# Patient Record
Sex: MALE | Race: ASIAN | NOT HISPANIC OR LATINO | URBAN - METROPOLITAN AREA
[De-identification: names, ages, dates, MRNs, and addresses within clinical notes are randomized per-mention and may not be internally consistent; named-entity substitution may affect disease eponyms.]

---

## 2021-06-12 ENCOUNTER — INPATIENT (INPATIENT)
Facility: HOSPITAL | Age: 66
LOS: 9 days | Discharge: HOME | DRG: 64 | End: 2021-06-22
Attending: NEUROLOGICAL SURGERY | Admitting: NEUROLOGICAL SURGERY
Payer: COMMERCIAL

## 2021-06-12 ENCOUNTER — TRANSCRIPTION ENCOUNTER (OUTPATIENT)
Age: 66
End: 2021-06-12

## 2021-06-12 ENCOUNTER — APPOINTMENT (OUTPATIENT)
Dept: NEUROSURGERY | Facility: HOSPITAL | Age: 66
End: 2021-06-12
Payer: MEDICARE

## 2021-06-12 ENCOUNTER — EMERGENCY (EMERGENCY)
Facility: HOSPITAL | Age: 66
LOS: 1 days | Discharge: ANOTHER HOSPITAL | End: 2021-06-12
Attending: EMERGENCY MEDICINE | Admitting: EMERGENCY MEDICINE
Payer: MEDICARE

## 2021-06-12 VITALS
SYSTOLIC BLOOD PRESSURE: 205 MMHG | DIASTOLIC BLOOD PRESSURE: 123 MMHG | HEART RATE: 91 BPM | TEMPERATURE: 98 F | OXYGEN SATURATION: 99 % | RESPIRATION RATE: 16 BRPM

## 2021-06-12 VITALS
DIASTOLIC BLOOD PRESSURE: 99 MMHG | RESPIRATION RATE: 22 BRPM | HEIGHT: 69 IN | OXYGEN SATURATION: 97 % | SYSTOLIC BLOOD PRESSURE: 139 MMHG | TEMPERATURE: 99 F | WEIGHT: 170.2 LBS | HEART RATE: 120 BPM

## 2021-06-12 VITALS
OXYGEN SATURATION: 97 % | DIASTOLIC BLOOD PRESSURE: 93 MMHG | HEART RATE: 114 BPM | RESPIRATION RATE: 20 BRPM | SYSTOLIC BLOOD PRESSURE: 167 MMHG

## 2021-06-12 DIAGNOSIS — I69.190 APRAXIA FOLLOWING NONTRAUMATIC INTRACEREBRAL HEMORRHAGE: ICD-10-CM

## 2021-06-12 DIAGNOSIS — I60.9 NONTRAUMATIC SUBARACHNOID HEMORRHAGE, UNSPECIFIED: ICD-10-CM

## 2021-06-12 DIAGNOSIS — I61.9 NONTRAUMATIC INTRACEREBRAL HEMORRHAGE, UNSPECIFIED: ICD-10-CM

## 2021-06-12 DIAGNOSIS — R09.89 OTHER SPECIFIED SYMPTOMS AND SIGNS INVOLVING THE CIRCULATORY AND RESPIRATORY SYSTEMS: ICD-10-CM

## 2021-06-12 LAB
ALBUMIN SERPL ELPH-MCNC: 4.5 G/DL — SIGNIFICANT CHANGE UP (ref 3.3–5)
ALP SERPL-CCNC: 118 U/L — SIGNIFICANT CHANGE UP (ref 40–120)
ALT FLD-CCNC: 38 U/L — SIGNIFICANT CHANGE UP (ref 4–41)
ANION GAP SERPL CALC-SCNC: 13 MMOL/L — SIGNIFICANT CHANGE UP (ref 7–14)
ANION GAP SERPL CALC-SCNC: 15 MMOL/L — SIGNIFICANT CHANGE UP (ref 5–17)
APPEARANCE UR: CLEAR — SIGNIFICANT CHANGE UP
APTT BLD: 30.2 SEC — SIGNIFICANT CHANGE UP (ref 27–36.3)
AST SERPL-CCNC: 31 U/L — SIGNIFICANT CHANGE UP (ref 4–40)
BASOPHILS # BLD AUTO: 0.08 K/UL — SIGNIFICANT CHANGE UP (ref 0–0.2)
BASOPHILS NFR BLD AUTO: 1.5 % — SIGNIFICANT CHANGE UP (ref 0–2)
BILIRUB SERPL-MCNC: 0.3 MG/DL — SIGNIFICANT CHANGE UP (ref 0.2–1.2)
BILIRUB UR-MCNC: NEGATIVE — SIGNIFICANT CHANGE UP
BLD GP AB SCN SERPL QL: NEGATIVE — SIGNIFICANT CHANGE UP
BLD GP AB SCN SERPL QL: NEGATIVE — SIGNIFICANT CHANGE UP
BUN SERPL-MCNC: 11 MG/DL — SIGNIFICANT CHANGE UP (ref 7–23)
BUN SERPL-MCNC: 15 MG/DL — SIGNIFICANT CHANGE UP (ref 7–23)
CALCIUM SERPL-MCNC: 9.4 MG/DL — SIGNIFICANT CHANGE UP (ref 8.4–10.5)
CALCIUM SERPL-MCNC: 9.8 MG/DL — SIGNIFICANT CHANGE UP (ref 8.4–10.5)
CHLORIDE SERPL-SCNC: 100 MMOL/L — SIGNIFICANT CHANGE UP (ref 98–107)
CHLORIDE SERPL-SCNC: 101 MMOL/L — SIGNIFICANT CHANGE UP (ref 96–108)
CO2 SERPL-SCNC: 21 MMOL/L — LOW (ref 22–31)
CO2 SERPL-SCNC: 23 MMOL/L — SIGNIFICANT CHANGE UP (ref 22–31)
COLOR SPEC: SIGNIFICANT CHANGE UP
CREAT SERPL-MCNC: 0.67 MG/DL — SIGNIFICANT CHANGE UP (ref 0.5–1.3)
CREAT SERPL-MCNC: 0.83 MG/DL — SIGNIFICANT CHANGE UP (ref 0.5–1.3)
DIFF PNL FLD: NEGATIVE — SIGNIFICANT CHANGE UP
EOSINOPHIL # BLD AUTO: 0.18 K/UL — SIGNIFICANT CHANGE UP (ref 0–0.5)
EOSINOPHIL NFR BLD AUTO: 3.5 % — SIGNIFICANT CHANGE UP (ref 0–6)
GLUCOSE SERPL-MCNC: 115 MG/DL — HIGH (ref 70–99)
GLUCOSE SERPL-MCNC: 131 MG/DL — HIGH (ref 70–99)
GLUCOSE UR QL: NEGATIVE — SIGNIFICANT CHANGE UP
HCT VFR BLD CALC: 49 % — SIGNIFICANT CHANGE UP (ref 39–50)
HGB BLD-MCNC: 16.5 G/DL — SIGNIFICANT CHANGE UP (ref 13–17)
IANC: 2.6 K/UL — SIGNIFICANT CHANGE UP (ref 1.5–8.5)
IMM GRANULOCYTES NFR BLD AUTO: 0.2 % — SIGNIFICANT CHANGE UP (ref 0–1.5)
INR BLD: 1.03 RATIO — SIGNIFICANT CHANGE UP (ref 0.88–1.16)
KETONES UR-MCNC: ABNORMAL
LEUKOCYTE ESTERASE UR-ACNC: NEGATIVE — SIGNIFICANT CHANGE UP
LYMPHOCYTES # BLD AUTO: 1.82 K/UL — SIGNIFICANT CHANGE UP (ref 1–3.3)
LYMPHOCYTES # BLD AUTO: 35 % — SIGNIFICANT CHANGE UP (ref 13–44)
MAGNESIUM SERPL-MCNC: 1.7 MG/DL — SIGNIFICANT CHANGE UP (ref 1.6–2.6)
MCHC RBC-ENTMCNC: 31.4 PG — SIGNIFICANT CHANGE UP (ref 27–34)
MCHC RBC-ENTMCNC: 33.7 GM/DL — SIGNIFICANT CHANGE UP (ref 32–36)
MCV RBC AUTO: 93.3 FL — SIGNIFICANT CHANGE UP (ref 80–100)
MONOCYTES # BLD AUTO: 0.51 K/UL — SIGNIFICANT CHANGE UP (ref 0–0.9)
MONOCYTES NFR BLD AUTO: 9.8 % — SIGNIFICANT CHANGE UP (ref 2–14)
NEUTROPHILS # BLD AUTO: 2.6 K/UL — SIGNIFICANT CHANGE UP (ref 1.8–7.4)
NEUTROPHILS NFR BLD AUTO: 50 % — SIGNIFICANT CHANGE UP (ref 43–77)
NITRITE UR-MCNC: NEGATIVE — SIGNIFICANT CHANGE UP
NRBC # BLD: 0 /100 WBCS — SIGNIFICANT CHANGE UP
NRBC # FLD: 0 K/UL — SIGNIFICANT CHANGE UP
PH UR: 6.5 — SIGNIFICANT CHANGE UP (ref 5–8)
PHOSPHATE SERPL-MCNC: 2.9 MG/DL — SIGNIFICANT CHANGE UP (ref 2.5–4.5)
PLATELET # BLD AUTO: 195 K/UL — SIGNIFICANT CHANGE UP (ref 150–400)
POTASSIUM SERPL-MCNC: 3.6 MMOL/L — SIGNIFICANT CHANGE UP (ref 3.5–5.3)
POTASSIUM SERPL-MCNC: 4.2 MMOL/L — SIGNIFICANT CHANGE UP (ref 3.5–5.3)
POTASSIUM SERPL-SCNC: 3.6 MMOL/L — SIGNIFICANT CHANGE UP (ref 3.5–5.3)
POTASSIUM SERPL-SCNC: 4.2 MMOL/L — SIGNIFICANT CHANGE UP (ref 3.5–5.3)
PROT SERPL-MCNC: 7.3 G/DL — SIGNIFICANT CHANGE UP (ref 6–8.3)
PROT UR-MCNC: ABNORMAL
PROTHROM AB SERPL-ACNC: 11.8 SEC — SIGNIFICANT CHANGE UP (ref 10.6–13.6)
RBC # BLD: 5.25 M/UL — SIGNIFICANT CHANGE UP (ref 4.2–5.8)
RBC # FLD: 12 % — SIGNIFICANT CHANGE UP (ref 10.3–14.5)
RH IG SCN BLD-IMP: POSITIVE — SIGNIFICANT CHANGE UP
SARS-COV-2 RNA SPEC QL NAA+PROBE: SIGNIFICANT CHANGE UP
SARS-COV-2 RNA SPEC QL NAA+PROBE: SIGNIFICANT CHANGE UP
SODIUM SERPL-SCNC: 136 MMOL/L — SIGNIFICANT CHANGE UP (ref 135–145)
SODIUM SERPL-SCNC: 137 MMOL/L — SIGNIFICANT CHANGE UP (ref 135–145)
SP GR SPEC: 1.03 — HIGH (ref 1.01–1.02)
UROBILINOGEN FLD QL: SIGNIFICANT CHANGE UP
WBC # BLD: 5.2 K/UL — SIGNIFICANT CHANGE UP (ref 3.8–10.5)
WBC # FLD AUTO: 5.2 K/UL — SIGNIFICANT CHANGE UP (ref 3.8–10.5)

## 2021-06-12 PROCEDURE — 99291 CRITICAL CARE FIRST HOUR: CPT

## 2021-06-12 PROCEDURE — 99284 EMERGENCY DEPT VISIT MOD MDM: CPT

## 2021-06-12 PROCEDURE — 76377 3D RENDER W/INTRP POSTPROCES: CPT | Mod: 26

## 2021-06-12 PROCEDURE — 71045 X-RAY EXAM CHEST 1 VIEW: CPT | Mod: 26

## 2021-06-12 PROCEDURE — 36227 PLACE CATH XTRNL CAROTID: CPT

## 2021-06-12 PROCEDURE — 36223 PLACE CATH CAROTID/INOM ART: CPT | Mod: 59,RT

## 2021-06-12 PROCEDURE — 70498 CT ANGIOGRAPHY NECK: CPT | Mod: 26

## 2021-06-12 PROCEDURE — 70450 CT HEAD/BRAIN W/O DYE: CPT | Mod: 26,59

## 2021-06-12 PROCEDURE — 36224 PLACE CATH CAROTD ART: CPT | Mod: LT

## 2021-06-12 PROCEDURE — 36620 INSERTION CATHETER ARTERY: CPT

## 2021-06-12 PROCEDURE — 70496 CT ANGIOGRAPHY HEAD: CPT | Mod: 26

## 2021-06-12 PROCEDURE — 36226 PLACE CATH VERTEBRAL ART: CPT | Mod: 50

## 2021-06-12 RX ORDER — LEVETIRACETAM 250 MG/1
500 TABLET, FILM COATED ORAL
Refills: 0 | Status: DISCONTINUED | OUTPATIENT
Start: 2021-06-12 | End: 2021-06-15

## 2021-06-12 RX ORDER — SENNA PLUS 8.6 MG/1
2 TABLET ORAL AT BEDTIME
Refills: 0 | Status: DISCONTINUED | OUTPATIENT
Start: 2021-06-12 | End: 2021-06-22

## 2021-06-12 RX ORDER — POTASSIUM CHLORIDE 20 MEQ
40 PACKET (EA) ORAL ONCE
Refills: 0 | Status: COMPLETED | OUTPATIENT
Start: 2021-06-12 | End: 2021-06-13

## 2021-06-12 RX ORDER — NICARDIPINE HYDROCHLORIDE 30 MG/1
10 CAPSULE, EXTENDED RELEASE ORAL
Qty: 40 | Refills: 0 | Status: DISCONTINUED | OUTPATIENT
Start: 2021-06-12 | End: 2021-06-13

## 2021-06-12 RX ORDER — MAGNESIUM SULFATE 500 MG/ML
1 VIAL (ML) INJECTION ONCE
Refills: 0 | Status: COMPLETED | OUTPATIENT
Start: 2021-06-12 | End: 2021-06-13

## 2021-06-12 RX ORDER — METOCLOPRAMIDE HCL 10 MG
10 TABLET ORAL ONCE
Refills: 0 | Status: COMPLETED | OUTPATIENT
Start: 2021-06-12 | End: 2021-06-12

## 2021-06-12 RX ORDER — NIMODIPINE 60 MG/10ML
60 SOLUTION ORAL EVERY 4 HOURS
Refills: 0 | Status: DISCONTINUED | OUTPATIENT
Start: 2021-06-12 | End: 2021-06-22

## 2021-06-12 RX ORDER — CHLORHEXIDINE GLUCONATE 213 G/1000ML
1 SOLUTION TOPICAL
Refills: 0 | Status: DISCONTINUED | OUTPATIENT
Start: 2021-06-12 | End: 2021-06-18

## 2021-06-12 RX ORDER — SODIUM CHLORIDE 9 MG/ML
1000 INJECTION, SOLUTION INTRAVENOUS ONCE
Refills: 0 | Status: COMPLETED | OUTPATIENT
Start: 2021-06-12 | End: 2021-06-12

## 2021-06-12 RX ORDER — ACETAMINOPHEN 500 MG
1000 TABLET ORAL ONCE
Refills: 0 | Status: COMPLETED | OUTPATIENT
Start: 2021-06-12 | End: 2021-06-12

## 2021-06-12 RX ORDER — NICARDIPINE HYDROCHLORIDE 30 MG/1
5 CAPSULE, EXTENDED RELEASE ORAL
Qty: 40 | Refills: 0 | Status: DISCONTINUED | OUTPATIENT
Start: 2021-06-12 | End: 2021-06-16

## 2021-06-12 RX ORDER — SODIUM CHLORIDE 9 MG/ML
1000 INJECTION INTRAMUSCULAR; INTRAVENOUS; SUBCUTANEOUS
Refills: 0 | Status: DISCONTINUED | OUTPATIENT
Start: 2021-06-12 | End: 2021-06-13

## 2021-06-12 RX ORDER — ACETAMINOPHEN 500 MG
650 TABLET ORAL EVERY 6 HOURS
Refills: 0 | Status: DISCONTINUED | OUTPATIENT
Start: 2021-06-12 | End: 2021-06-17

## 2021-06-12 RX ORDER — POLYETHYLENE GLYCOL 3350 17 G/17G
17 POWDER, FOR SOLUTION ORAL DAILY
Refills: 0 | Status: DISCONTINUED | OUTPATIENT
Start: 2021-06-12 | End: 2021-06-14

## 2021-06-12 RX ADMIN — SODIUM CHLORIDE 75 MILLILITER(S): 9 INJECTION INTRAMUSCULAR; INTRAVENOUS; SUBCUTANEOUS at 19:00

## 2021-06-12 RX ADMIN — NIMODIPINE 60 MILLIGRAM(S): 60 SOLUTION ORAL at 18:37

## 2021-06-12 RX ADMIN — LEVETIRACETAM 500 MILLIGRAM(S): 250 TABLET, FILM COATED ORAL at 18:37

## 2021-06-12 RX ADMIN — Medication 400 MILLIGRAM(S): at 15:32

## 2021-06-12 RX ADMIN — NICARDIPINE HYDROCHLORIDE 25 MG/HR: 30 CAPSULE, EXTENDED RELEASE ORAL at 14:32

## 2021-06-12 RX ADMIN — SODIUM CHLORIDE 1000 MILLILITER(S): 9 INJECTION, SOLUTION INTRAVENOUS at 13:58

## 2021-06-12 RX ADMIN — Medication 10 MILLIGRAM(S): at 14:31

## 2021-06-12 NOTE — PROGRESS NOTE ADULT - SUBJECTIVE AND OBJECTIVE BOX
HPI:  65 year old man knonw to have HTN is presenting with severe headache. CT head showed SAH CTA no vascular malformation     EVENTS:   transferred to the [facility] for SAH workup and treatement       ICU Vital Signs Last 24 Hrs  T(C): 37.1 (12 Jun 2021 16:30), Max: 37.1 (12 Jun 2021 16:30)  T(F): 98.7 (12 Jun 2021 16:30), Max: 98.7 (12 Jun 2021 16:30)  HR: 114 (12 Jun 2021 15:50) (91 - 114)  BP: 139/99 (12 Jun 2021 16:30) (139/99 - 234/147)  BP(mean): 109 (12 Jun 2021 16:30) (109 - 129)  ABP: --  ABP(mean): --  RR: 22 (12 Jun 2021 16:30) (12 - 22)  SpO2: 97% (12 Jun 2021 16:30) (97% - 100%)                            16.5   5.20  )-----------( 195      ( 12 Jun 2021 13:30 )             49.0    06-12    136  |  100  |  15  ----------------------------<  115<H>  4.2   |  23  |  0.83    Ca    9.8      12 Jun 2021 13:30    TPro  7.3  /  Alb  4.5  /  TBili  0.3  /  DBili  x   /  AST  31  /  ALT  38  /  AlkPhos  118  06-12            PHYSICAL EXAM:    General: No Acute Distress     Neurological: Awake, alert oriented to person, place and time, Following Commands, right pupil 3 mm, left pupil 2 mm, EOMI, Face Symmetrical, Speech Fluent, Moving all extremities, Muscle Strength normal in all four extremities, No Drift, Sensation to Light Touch Intact    Pulmonary: Clear to Auscultation, No Rales, No Rhonchi, No Wheezes     Cardiovascular: S1, S2, Regular Rate and Rhythm     Gastrointestinal: Soft, Nontender, Nondistended     Extremities: No calf tenderness     Incision:       MEDICATIONS:  Antibiotics:      Neurological:     Cardiac:     Pulm:    Heme:     Other:        DEVICES: [] Restraints [] GÓMEZ/HMV []LD [] ET tube [] Trach [] Chest Tube [] A-line [] Tyson [] NGT [] Rectal Tube       A/P:  Neuro: neuro checks q 1 hr, CT head tomorrow morning, angio tomorrow, nimodipine, keppra 500 mg BID for seizure prophylaxis fentanyl PRN for agitation   Respiratory: RA  CV: NSR,  TTE, -140 mmhg, on nicardipine  Endocrine: finger sticks q6hrs, ISS , keep sugar 120-180 mmhg   Heme/Onc: INR <1.5, Plt>100            DVT ppx: SCD, contraindicated to start anticoagulant as she is PBD0   Renal: NS 75 ml/hr  ID: afebrile  GI: NPO, protonix, NG, colace   Social/Family: updated at bedside  Discharge planning: ICU    Code Status: [x] Full Code [] DNR [] DNI [] Goals of Care:   Disposition: [x] ICU [] Stroke Unit [] RCU []PCU []Floor [] Discharge Home     Patient at high risk for neurologic deterioration, aneurysm rerupture, seizures, ICP crisis, critical care time, excluding procedures: 40 minutes                   HPI:  65 year old man knonw to have HTN is presenting with severe headache. CT head showed SAH CTA no vascular malformation     EVENTS:   [facility] for SAH workup and treatement       ICU Vital Signs Last 24 Hrs  T(C): 37.1 (12 Jun 2021 16:30), Max: 37.1 (12 Jun 2021 16:30)  T(F): 98.7 (12 Jun 2021 16:30), Max: 98.7 (12 Jun 2021 16:30)  HR: 114 (12 Jun 2021 15:50) (91 - 114)  BP: 139/99 (12 Jun 2021 16:30) (139/99 - 234/147)  BP(mean): 109 (12 Jun 2021 16:30) (109 - 129)  ABP: --  ABP(mean): --  RR: 22 (12 Jun 2021 16:30) (12 - 22)  SpO2: 97% (12 Jun 2021 16:30) (97% - 100%)                            16.5   5.20  )-----------( 195      ( 12 Jun 2021 13:30 )             49.0    06-12    136  |  100  |  15  ----------------------------<  115<H>  4.2   |  23  |  0.83    Ca    9.8      12 Jun 2021 13:30    TPro  7.3  /  Alb  4.5  /  TBili  0.3  /  DBili  x   /  AST  31  /  ALT  38  /  AlkPhos  118  06-12            PHYSICAL EXAM:    General: No Acute Distress     Neurological: Awake, alert oriented to person, place and time, Following Commands, right pupil 3 mm, left pupil 2 mm, EOMI, Face Symmetrical, Speech Fluent, Moving all extremities, Muscle Strength normal in all four extremities, No Drift, Sensation to Light Touch Intact    Pulmonary: Clear to Auscultation, No Rales, No Rhonchi, No Wheezes     Cardiovascular: S1, S2, Regular Rate and Rhythm     Gastrointestinal: Soft, Nontender, Nondistended     Extremities: No calf tenderness     Incision:       MEDICATIONS:  Antibiotics:      Neurological:     Cardiac:     Pulm:    Heme:     Other:        DEVICES: [] Restraints [] GÓMEZ/HMV []LD [] ET tube [] Trach [] Chest Tube [] A-line [] Tyson [] NGT [] Rectal Tube       A/P: SAH likely aneurysmal although CTA is neg  mFS 2, HHS 2  NIHSS 0   Neuro: neuro checks q 1 hr, CT head tomorrow morning, angio tomorrow, nimodipine, keppra 500 mg BID for seizure prophylaxis fentanyl PRN for agitation   Respiratory: RA  CV: NSR,  TTE, -140 mmhg, on nicardipine  Endocrine: finger sticks q6hrs, ISS , keep sugar 120-180 mmhg   Heme/Onc: INR <1.5, Plt>100            DVT ppx: SCD, contraindicated to start anticoagulant as she is PBD0   Renal: NS 75 ml/hr  ID: afebrile  GI: NPO, protonix, NG, colace   Social/Family: updated at bedside  Discharge planning: ICU    Code Status: [x] Full Code [] DNR [] DNI [] Goals of Care:   Disposition: [x] ICU [] Stroke Unit [] RCU []PCU []Floor [] Discharge Home     Patient at high risk for neurologic deterioration, aneurysm rerupture, seizures, ICP crisis, critical care time, excluding procedures: 40 minutes

## 2021-06-12 NOTE — ED PROVIDER NOTE - PHYSICAL EXAMINATION
VS:  unremarkable except HTN    GEN - malaise HTN;   A+O x3   HEAD - NC/AT     ENT - PEERL, EOMI, mucous membranes    moist , no discharge      NECK: Neck supple, non-tender without lymphadenopathy, no masses, no JVD  PULM - CTA b/l,  symmetric breath sounds  COR -  normal heart sounds    ABD - , ND, NT, soft,  BACK - no CVA tenderness, nontender spine     EXTREMS - no edema, no deformity, warm and well perfused    SKIN - no rash    or bruising      NEUROLOGIC - alert, face symmetric, speech fluent, sensation nl, motor no focal deficit.

## 2021-06-12 NOTE — ED ADULT NURSE REASSESSMENT NOTE - NS ED NURSE REASSESS COMMENT FT1
Anai RN: EMS here for transport to neuro ICU at [facility]. Pt is awake, responsive, AOX4, sinus tachy on the cardiac monitor, continues to be on Cardene GTT. Pt verbalized pain relief. Report given to receiving ICU RN Brunilda. Pt stable at this time

## 2021-06-12 NOTE — ED PROVIDER NOTE - CLINICAL SUMMARY MEDICAL DECISION MAKING FREE TEXT BOX
65M h/o HTN p/w headache starting at 1030 worsening over an hour to 1130 at which point it was severe.  At that time pt had slurred speech which improved and is now fine.  Still having HA.  Never happened before.  USOH prior to event.  No LOC or vomiting.  Pt malaised mild distress HA.  Speech is fluent.  face symmetric strength equal and full x 4 extrems.  Given in the window TIA stroke code called.  Poss I[facility] will check CTH.  D/w neuro at 115pm.

## 2021-06-12 NOTE — ED PROVIDER NOTE - OBJECTIVE STATEMENT
64 yo M w/ a PMHx of p/w severe HA, back of neck pain, and palpitations since this AM. Starting at 10:30am he had palps then at 11:30am while working, he developed nausea, dizziness, and neck pain. Noted to be hypertensive here (205/123).     PMD:

## 2021-06-12 NOTE — CONSULT NOTE ADULT - ATTENDING COMMENTS
66yo R-handed man with PMH of HTN presents to the ED with headache, nausea, and palpitations since this morning. LKN 1030h. 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 Additionally ED staff reported patient had transient slurred speech at approximately 1100h which resolved   neursurgery consult    mri   angiogram
SAH, transfer to [facility] for management

## 2021-06-12 NOTE — ED ADULT TRIAGE NOTE - CHIEF COMPLAINT QUOTE
brought in by EMS from workplace. Pt states c/o palpitations at 1030 this morning, went to work and at 1130 had sudden onset of dizziness, HA and nausea and posterior neck pain. brought in by EMS from workplace. Pt states c/o palpitations at 1030 this morning, went to work and at 1130 had sudden onset of dizziness, HA and nausea and posterior neck pain. Pt states he was moving body up and down carrying light things, heavy things at work.

## 2021-06-12 NOTE — PROCEDURE NOTE - NSPROCDETAILS_GEN_ALL_CORE
location identified, draped/prepped, sterile technique used, needle inserted/introduced/connected to a pressurized flush line/sutured in place/hemostasis with direct pressure, dressing applied/Seldinger technique/all materials/supplies accounted for at end of procedure

## 2021-06-12 NOTE — CONSULT NOTE ADULT - ASSESSMENT
OH, GWANG  65M pw severe HA, neck pain this am found to have SAH HH1MF3, R carotid occlusion at bifurcation, and a hypoplastic L vert, and an abnormal area of enhancement anterior to the basilar concerning for a vascular abnormality.   Exam: AOx3, FC, PERRL, EOMI, no facial, 5/5 throughout, no drift  [facility]  -q1 neurochecks  -Keppra 1000  --140, nicardipine drip as needed  -pre op for angiogram, npo, coags

## 2021-06-12 NOTE — STROKE CODE NOTE - HUNT AND HESS GRADING
(2) Moderate to severe headache, nuchal rigidity, no neurological deficit or other cranial nerve palsy

## 2021-06-12 NOTE — ED ADULT NURSE NOTE - CHIEF COMPLAINT QUOTE
brought in by EMS from workplace. Pt states c/o palpitations at 1030 this morning, went to work and at 1130 had sudden onset of dizziness, HA and nausea and posterior neck pain. Pt states he was moving body up and down carrying light things, heavy things at work.

## 2021-06-12 NOTE — H&P ADULT - NSHPPHYSICALEXAM_GEN_ALL_CORE
Alter and oriented x3, Following commands, Rt pupil 3mm Lt pupil 2mm, moving all extremities 5/5, no drift

## 2021-06-12 NOTE — H&P ADULT - HISTORY OF PRESENT ILLNESS
65 year old male with no significant PMHx  presented on the morning od admission with severe HA, neck pain, and palpitations. At 10:30am he had palps then at 11:30am while working, he developed nausea, dizziness, and neck pain. Noted to be hypertensive on arrival to the hospital (205/123).   CT head and CTA showed Hyperattenuation in the prepontine cistern, left ambient cistern, and left lateral portion of the suprasellar cistern compatible with subarachnoid hemorrhage. 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  CT angiography brain was negative. Recommended for further angiogram evaluation.            65 year old male with PMHx of HTN (doesn't remember his home meds) presented on the morning od admission with severe HA, neck pain, and palpitations. At 10:30am he had palps then at 11:30am while working, he developed nausea, dizziness, and neck pain. Noted to be hypertensive on arrival to the hospital (205/123).   CT head and CTA showed Hyperattenuation in the prepontine cistern, left ambient cistern, and left lateral portion of the suprasellar cistern compatible with subarachnoid hemorrhage. 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  CT angiography brain was negative. Recommended for further angiogram evaluation.

## 2021-06-12 NOTE — CONSULT NOTE ADULT - SUBJECTIVE AND OBJECTIVE BOX
Neurology  Consult Note  06-12-21    Name:  TERRELL RIOJAS; 65y (1955)    Neurology consult: 64yo R-handed man with PMH of HTN presents to the ED with headache, nausea, and palpitations since this morning. LKN 1030h. 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 Additionally ED staff reported patient had transient slurred speech at approximately 1100h which resolved. Patient reports the headache is 10/10 in severity and started suddenly. He reports taking blood pressure medications regularly and does not take aspirin or anticoagulation. He denies weakness, numbness, acute changes in vision, vomiting, dizziness, difficulty speaking or swallowing, recent fever illness, trauma or neck manipulation.    In the ED code stroke was activated. NIHSS 0. Pre-MRS 0. CTH shows a subarachnoid hemorrhage. CTA head/neck shows occlusion of the R ICA starting at the carotid bifurcation and a hypoattenuating R thyroid gland. The contrast enhancement of the vascular structure in the L perimesencephalic cistern does not have the appearance of a P-comm aneurysm. Patient was not a candidate for tPA or thrombectomy due to the subarachnoid hemorrhage.    Review of Systems:  As states in HPI.        PMHx:   HTN      PFHx:   No pertinent family history in first degree relatives      PSuHx:   No significant past surgical history        Medications:  MEDICATIONS  (STANDING):  niCARdipine Infusion 5 mG/Hr (25 mL/Hr) IV Continuous <Continuous>      Vitals:  T(C): 37.1 (06-12-21 @ 16:30), Max: 37.1 (06-12-21 @ 16:30)  HR: 116 (06-12-21 @ 17:00) (91 - 127)  BP: 126/87 (06-12-21 @ 16:45) (126/87 - 234/147)  RR: 20 (06-12-21 @ 17:00) (12 - 22)  SpO2: 97% (06-12-21 @ 17:00) (97% - 100%)    Physical Examination:  Neurologic:  - Mental Status: Alert, awake, oriented to person, place, and time; Speech is fluent with intact naming, repetition, and comprehension; Good overall fund of knowledge; Grossly follows all commands.  - Cranial Nerves:  II: Visual fields are full to confrontation; Pupils are equal, round, and reactive to light;  III, IV, VI: Extraocular movements are intact without nystagmus.  V: Facial sensation is intact in the V1-V3 distribution bilaterally.  VII: Face is symmetric with normal eye closure and smile.  VIII: Hearing is intact to finger rub.  IX, X: Uvula is midline and soft palate rises symmetrically.  XI: Head turning and shoulder shrug are intact.  XII: Tongue protrudes in the midline.  - Motor: Strength is 5/5 throughout. There is no pronator drift.  - Reflexes: 2+ and symmetric at the biceps, brachioradialis, knees, and ankles. Plantar responses down bilaterally.  - Sensory: Intact throughout to light touch. No extinction on double stimulation.  - Coordination: Finger-nose-finger intact without dysmetria.  - Gait: Deferred    Labs:                        16.5   5.20  )-----------( 195      ( 12 Jun 2021 13:30 )             49.0     06-12    136  |  100  |  15  ----------------------------<  115<H>  4.2   |  23  |  0.83    Ca    9.8      12 Jun 2021 13:30    TPro  7.3  /  Alb  4.5  /  TBili  0.3  /  DBili  x   /  AST  31  /  ALT  38  /  AlkPhos  118  06-12    CAPILLARY BLOOD GLUCOSE      POCT Blood Glucose.: 114 mg/dL (12 Jun 2021 12:59)    LIVER FUNCTIONS - ( 12 Jun 2021 13:30 )  Alb: 4.5 g/dL / Pro: 7.3 g/dL / ALK PHOS: 118 U/L / ALT: 38 U/L / AST: 31 U/L / GGT: x             PT/INR - ( 12 Jun 2021 13:30 )   PT: 11.8 sec;   INR: 1.03 ratio    PTT - ( 12 Jun 2021 13:30 )  PTT:30.2 sec        Radiology:  CT Brain Stroke Protocol (06.12.21 @ 13:29)  IMPRESSION:  Hyperattenuation in the prepontine cistern, left ambient cistern, and left lateral portion of the suprasellar cistern compatible with subarachnoid hemorrhage. CTA is recommended.    CT Angio Head w/ IV Cont (06.12.21 @ 13:30)  IMPRESSION:  CT angiography neck:  Occlusion of the right internal carotid artery originating at the carotid bifurcation through the distal cavernous segment.    Hypoattenuating focus in the right thyroid gland. Recommend thyroid ultrasound.    CT angiography brain:    Subarachnoid hemorrhage.  Contrast enhancement of an apparent vascular structure in the left perimesencephalic cistern which does not have the appearance of a P-comm aneurysm. Recommend cerebral angiogram for further evaluation.   Neurology  Consult Note  06-12-21    Name:  TERRELL RIOJAS; 65y (1955)    Neurology consult: 66yo R-handed man with PMH of HTN presents to the ED with headache, nausea, and palpitations since this morning. LKN 1030h. 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 Additionally ED staff reported patient had transient slurred speech at approximately 1100h which resolved. Patient reports the headache is 10/10 in severity and started suddenly. He reports taking blood pressure medications regularly and does not take aspirin or anticoagulation. He reported possibly darkening of his vision but denied vision loss, weakness, numbness, vomiting, dizziness, difficulty speaking or swallowing, recent fever illness, trauma or neck manipulation.    In the ED code stroke was activated. NIHSS 0. Pre-MRS 0. CTH shows a subarachnoid hemorrhage. CTA head/neck shows occlusion of the R ICA starting at the carotid bifurcation and a hypoattenuating R thyroid gland. The contrast enhancement of the vascular structure in the L perimesencephalic cistern does not have the appearance of a P-comm aneurysm. Patient was not a candidate for tPA or thrombectomy due to the subarachnoid hemorrhage.    Review of Systems:  As states in HPI.        PMHx:   HTN      PFHx:   No pertinent family history in first degree relatives      PSuHx:   No significant past surgical history        Medications:  MEDICATIONS  (STANDING):  niCARdipine Infusion 5 mG/Hr (25 mL/Hr) IV Continuous <Continuous>      Vitals:  T(C): 37.1 (06-12-21 @ 16:30), Max: 37.1 (06-12-21 @ 16:30)  HR: 116 (06-12-21 @ 17:00) (91 - 127)  BP: 126/87 (06-12-21 @ 16:45) (126/87 - 234/147)  RR: 20 (06-12-21 @ 17:00) (12 - 22)  SpO2: 97% (06-12-21 @ 17:00) (97% - 100%)    Physical Examination:  Neurologic:  - Mental Status: Alert, awake, oriented to person, place, and time; Speech is fluent with intact naming, repetition, and comprehension; Good overall fund of knowledge; Grossly follows all commands.  - Cranial Nerves:  II: Visual fields are full to confrontation; Pupils are equal, round, and reactive to light;  III, IV, VI: Extraocular movements are intact without nystagmus.  V: Facial sensation is intact in the V1-V3 distribution bilaterally.  VII: Face is symmetric with normal eye closure and smile.  VIII: Hearing is intact to finger rub.  IX, X: Uvula is midline and soft palate rises symmetrically.  XI: Head turning and shoulder shrug are intact.  XII: Tongue protrudes in the midline.  - Motor: Strength is 5/5 throughout. There is no pronator drift.  - Reflexes: 2+ and symmetric at the biceps, brachioradialis, knees, and ankles. Plantar responses down bilaterally.  - Sensory: Intact throughout to light touch. No extinction on double stimulation.  - Coordination: Finger-nose-finger intact without dysmetria.  - Gait: Deferred    Labs:                        16.5   5.20  )-----------( 195      ( 12 Jun 2021 13:30 )             49.0     06-12    136  |  100  |  15  ----------------------------<  115<H>  4.2   |  23  |  0.83    Ca    9.8      12 Jun 2021 13:30    TPro  7.3  /  Alb  4.5  /  TBili  0.3  /  DBili  x   /  AST  31  /  ALT  38  /  AlkPhos  118  06-12    CAPILLARY BLOOD GLUCOSE      POCT Blood Glucose.: 114 mg/dL (12 Jun 2021 12:59)    LIVER FUNCTIONS - ( 12 Jun 2021 13:30 )  Alb: 4.5 g/dL / Pro: 7.3 g/dL / ALK PHOS: 118 U/L / ALT: 38 U/L / AST: 31 U/L / GGT: x             PT/INR - ( 12 Jun 2021 13:30 )   PT: 11.8 sec;   INR: 1.03 ratio    PTT - ( 12 Jun 2021 13:30 )  PTT:30.2 sec        Radiology:  CT Brain Stroke Protocol (06.12.21 @ 13:29)  IMPRESSION:  Hyperattenuation in the prepontine cistern, left ambient cistern, and left lateral portion of the suprasellar cistern compatible with subarachnoid hemorrhage. CTA is recommended.    CT Angio Head w/ IV Cont (06.12.21 @ 13:30)  IMPRESSION:  CT angiography neck:  Occlusion of the right internal carotid artery originating at the carotid bifurcation through the distal cavernous segment.    Hypoattenuating focus in the right thyroid gland. Recommend thyroid ultrasound.    CT angiography brain:    Subarachnoid hemorrhage.  Contrast enhancement of an apparent vascular structure in the left perimesencephalic cistern which does not have the appearance of a P-comm aneurysm. Recommend cerebral angiogram for further evaluation.

## 2021-06-12 NOTE — CHART NOTE - NSCHARTNOTEFT_GEN_A_CORE
Interventional Neuro- Radiology   Procedure Note    Procedure: Selective Cerebral Angiography   Pre- Procedure Diagnosis: SAH  Post- Procedure Diagnosis: negative for source of hemorrhage    : Dr. Ray MD  Fellow: Dr. Poppy MD  Physician Assistant: Patsy Galicia PA-C    RN: Elza  Tech: Karl/ Tashi    Anesthesia: Dr. Terra Calvillo MD  (general anesthesia)    I/Os:  Fluids: 1 L  Tyson: 700 cc  Contrast: 106 cc  Estimated Blood Loss: <10cc    ACT-147     Preliminary Report:  Under general anesthesia, using a 5Fr 90 arrowflex sheath to the right groin examination of left vertebral artery/ left internal carotid artery/ left external carotid artery/ right vertebral artery/ right internal carotid artery/ right external carotid artery via selective cerebral angiography demonstrates negative cerebral angiogram for source of hemorrhage. (Official note to follow).    Patient tolerated procedure well, vital signs stable, hemodynamically stable, no change in neurological status compared to baseline. Results discussed with neurosurgery/ patient and their family. Groin sheath d/c'ed, manual compression held to hemostasis, no active bleeding, no hematoma, vascade closure device applied, quick clot and safeguard balloon dressing applied at 9:45h. Patient transferred to NSCU for further care/ monitoring.     Patsy Galicia PA-C

## 2021-06-12 NOTE — CONSULT NOTE ADULT - SUBJECTIVE AND OBJECTIVE BOX
TERRELL RIOJAS 65y ,Male  HPI:    PAST MEDICAL & SURGICAL HISTORY:    Allergies  No Known Allergies    Intolerances      MEDICATIONS  (STANDING):  lactated ringers Bolus 1000 milliLiter(s) IV Bolus once  metoclopramide Injectable 10 milliGRAM(s) IV Push once  niCARdipine Infusion 5 mG/Hr (25 mL/Hr) IV Continuous <Continuous>    MEDICATIONS  (PRN):    Vital Signs Last 24 Hrs  T(C): 36.9 (12 Jun 2021 12:54), Max: 36.9 (12 Jun 2021 12:54)  T(F): 98.4 (12 Jun 2021 12:54), Max: 98.4 (12 Jun 2021 12:54)  HR: 91 (12 Jun 2021 12:54) (91 - 91)  BP: 205/123 (12 Jun 2021 12:54) (205/123 - 205/123)  BP(mean): --  RR: 16 (12 Jun 2021 12:54) (16 - 16)  SpO2: 99% (12 Jun 2021 12:54) (99% - 99%)    PE:  AA&0 x 3, CN 2-12 grossly intact, speach clear, follows commands, PERRL  Motor: strength : BUE  Sensory:  Incision site:     LABS:                        16.5   5.20  )-----------( 195      ( 12 Jun 2021 13:30 )             49.0           PT/INR - ( 12 Jun 2021 13:30 )   PT: 11.8 sec;   INR: 1.03 ratio         PTT - ( 12 Jun 2021 13:30 )  PTT:30.2 sec           TERRELL RIOJAS 65y ,Male  HPI:  66 yo M p/w severe HA, neck pain, and palpitations since this AM. Starting at 10:30am he had palps then at 11:30am while working, he developed nausea, dizziness, and neck pain. Noted to be hypertensive here (205/123).   CT head and CTA showed Hyperattenuation in the prepontine cistern, left ambient cistern, and left lateral portion of the suprasellar cistern compatible with subarachnoid hemorrhage. CTA is recommended.      PAST MEDICAL & SURGICAL HISTORY:    Allergies  No Known Allergies    MEDICATIONS  (STANDING):  lactated ringers Bolus 1000 milliLiter(s) IV Bolus once  metoclopramide Injectable 10 milliGRAM(s) IV Push once  niCARdipine Infusion 5 mG/Hr (25 mL/Hr) IV Continuous <Continuous>    Vital Signs Last 24 Hrs  T(C): 36.9 (12 Jun 2021 12:54), Max: 36.9 (12 Jun 2021 12:54)  T(F): 98.4 (12 Jun 2021 12:54), Max: 98.4 (12 Jun 2021 12:54)  HR: 91 (12 Jun 2021 12:54) (91 - 91)  BP: 205/123 (12 Jun 2021 12:54) (205/123 - 205/123)  BP(mean): --  RR: 16 (12 Jun 2021 12:54) (16 - 16)  SpO2: 99% (12 Jun 2021 12:54) (99% - 99%)    PE:  AA&0 x   Motor: strength : BUE  Sensory:      LABS:                        16.5   5.20  )-----------( 195      ( 12 Jun 2021 13:30 )             49.0           PT/INR - ( 12 Jun 2021 13:30 )   PT: 11.8 sec;   INR: 1.03 ratio         PTT - ( 12 Jun 2021 13:30 )  PTT:30.2 sec           TERRELL ROIJAS 65y ,Male  HPI:  66 yo M p/w severe HA, neck pain, and palpitations since this AM. Starting at 10:30am he had palps then at 11:30am while working, he developed nausea, dizziness, and neck pain. Noted to be hypertensive here (205/123).   CT head and CTA showed Hyperattenuation in the prepontine cistern, left ambient cistern, and left lateral portion of the suprasellar cistern compatible with subarachnoid hemorrhage. 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  Hypoattenuating focus in the right thyroid gland. Recommend thyroid ultrasound.  CT angiography brain:  Subarachnoid hemorrhage. Contrast enhancement of an apparent vascular structure in the left perimesencephalic cistern which does not have the appearance of a P-comm aneurysm. Recommend cerebral angiogram for further evaluation.    PAST MEDICAL & SURGICAL HISTORY:    Allergies  No Known Allergies    MEDICATIONS  (STANDING):  lactated ringers Bolus 1000 milliLiter(s) IV Bolus once  metoclopramide Injectable 10 milliGRAM(s) IV Push once  niCARdipine Infusion 5 mG/Hr (25 mL/Hr) IV Continuous <Continuous>    Vital Signs Last 24 Hrs  T(C): 36.9 (12 Jun 2021 12:54), Max: 36.9 (12 Jun 2021 12:54)  T(F): 98.4 (12 Jun 2021 12:54), Max: 98.4 (12 Jun 2021 12:54)  HR: 91 (12 Jun 2021 12:54) (91 - 91)  BP: 205/123 (12 Jun 2021 12:54) (205/123 - 205/123)  BP(mean): --  RR: 16 (12 Jun 2021 12:54) (16 - 16)  SpO2: 99% (12 Jun 2021 12:54) (99% - 99%)    PE:  AA&0 x   Motor: strength : BUE  Sensory:      LABS:                        16.5   5.20  )-----------( 195      ( 12 Jun 2021 13:30 )             49.0           PT/INR - ( 12 Jun 2021 13:30 )   PT: 11.8 sec;   INR: 1.03 ratio         PTT - ( 12 Jun 2021 13:30 )  PTT:30.2 sec           TERRELL RIOJAS 65y ,Male  HPI:  64 yo M p/w severe HA, neck pain, and palpitations since this AM. Starting at 10:30am he had palps then at 11:30am while working, he developed nausea, dizziness, and neck pain. Noted to be hypertensive here (205/123).   CT head and CTA showed Hyperattenuation in the prepontine cistern, left ambient cistern, and left lateral portion of the suprasellar cistern compatible with subarachnoid hemorrhage. 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  Hypoattenuating focus in the right thyroid gland. Recommend thyroid ultrasound.  CT angiography brain:  Subarachnoid hemorrhage. Contrast enhancement of an apparent vascular structure in the left perimesencephalic cistern which does not have the appearance of a P-comm aneurysm. Recommend cerebral angiogram for further evaluation.    PAST MEDICAL & SURGICAL HISTORY:    Allergies  No Known Allergies    MEDICATIONS  (STANDING):  lactated ringers Bolus 1000 milliLiter(s) IV Bolus once  metoclopramide Injectable 10 milliGRAM(s) IV Push once  niCARdipine Infusion 5 mG/Hr (25 mL/Hr) IV Continuous <Continuous>    Vital Signs Last 24 Hrs  T(C): 36.9 (12 Jun 2021 12:54), Max: 36.9 (12 Jun 2021 12:54)  T(F): 98.4 (12 Jun 2021 12:54), Max: 98.4 (12 Jun 2021 12:54)  HR: 91 (12 Jun 2021 12:54) (91 - 91)  BP: 205/123 (12 Jun 2021 12:54) (205/123 - 205/123)  BP(mean): --  RR: 16 (12 Jun 2021 12:54) (16 - 16)  SpO2: 99% (12 Jun 2021 12:54) (99% - 99%)    PE:  AA&0 x 3, CN 2-12 grossly intact, speech clear, follows commands  Motor: strength : BUE/BLE 5/5  Sensory: SILT  no drift, no ataxia      LABS:                        16.5   5.20  )-----------( 195      ( 12 Jun 2021 13:30 )             49.0           PT/INR - ( 12 Jun 2021 13:30 )   PT: 11.8 sec;   INR: 1.03 ratio         PTT - ( 12 Jun 2021 13:30 )  PTT:30.2 sec

## 2021-06-12 NOTE — CHART NOTE - NSCHARTNOTEFT_GEN_A_CORE
CAPRINI SCORE [CLOT] Score on Admission for     AGE RELATED RISK FACTORS                                                       MOBILITY RELATED FACTORS  [ ] Age 41-60 years                                            (1 Point)                  [ ] Bed rest                                                        (1 Point)  [x] Age: 61-74 years                                           (2 Points)                 [ ] Plaster cast                                                   (2 Points)  [ ] Age= 75 years                                              (3 Points)                 [ ] Bed bound for more than 72 hours                 (2 Points)    DISEASE RELATED RISK FACTORS                                               GENDER SPECIFIC FACTORS  [ ] Edema in the lower extremities                       (1 Point)                  [ ] Pregnancy                                                     (1 Point)  [ ] Varicose veins                                               (1 Point)                  [ ] Post-partum < 6 weeks                                   (1 Point)             [x] BMI > 25 Kg/m2                                            (1 Point)                  [ ] Hormonal therapy  or oral contraception          (1 Point)                 [ ] Sepsis (in the previous month)                        (1 Point)                  [ ] History of pregnancy complications                 (1 point)  [ ] Pneumonia or serious lung disease                                               [ ] Unexplained or recurrent                     (1 Point)           (in the previous month)                               (1 Point)  [ ] Abnormal pulmonary function test                     (1 Point)                 SURGERY RELATED RISK FACTORS (include planned surgeries)  [ ] Acute myocardial infarction                              (1 Point)                 [ ]  Section                                             (1 Point)  [ ] Congestive heart failure (in the previous month)  (1 Point)         [ ] Minor surgery                                                  (1 Point)   [ ] Inflammatory bowel disease                             (1 Point)                 [ ] Arthroscopic surgery                                        (2 Points)  [ ] Central venous access                                      (2 Points)                [ ] General surgery lasting more than 45 minutes   (2 Points)       [ ] Stroke (in the previous month)                          (5 Points)               [ ] Elective arthroplasty                                         (5 Points)            [ ] current or past malignancy                              (2 Points)                                                                                                       HEMATOLOGY RELATED FACTORS                                                 TRAUMA RELATED RISK FACTORS  [ ] Prior episodes of VTE                                     (3 Points)                [ ] Fracture of the hip, pelvis, or leg                       (5 Points)  [ ] Positive family history for VTE                         (3 Points)                 [ ] Acute spinal cord injury (in the previous month)  (5 Points)  [ ] Prothrombin 97749 A                                     (3 Points)                 [ ] Paralysis  (less than 1 month)                             (5 Points)  [ ] Factor V Leiden                                             (3 Points)                  [ ] Multiple Trauma within 1 month                        (5 Points)  [ ] Lupus anticoagulants                                     (3 Points)                                                           [ ] Anticardiolipin antibodies                               (3 Points)                                                       [ ] High homocysteine in the blood                      (3 Points)                                             [ ] Other congenital or acquired thrombophilia      (3 Points)                                                [ ] Heparin induced thrombocytopenia                  (3 Points)                                          Total Score [     3     ]    Risk:  Very low 0   Low 1 to 2   Moderate 3 to 4   High =5       VTE Prophylasix Recommednations:  [x] mechanical pneumatic compression devices                                      [ ] contraindicated: _____________________  [ ] chemo prophylasix                                                                                   [x] contraindicated _____________________    **** HIGH LIKELIHOOD DVT PRESENT ON ADMISSION  [x] (please order LE dopplers within 24 hours of admission)

## 2021-06-12 NOTE — ED ADULT NURSE REASSESSMENT NOTE - NS ED NURSE REASSESS COMMENT FT1
Anai RN: Pt is AOX4, c/o headache at this time, denies any dizziness or changes in vision at this time. Pt is on Nicardine Gtt at 5ml/hr. MD Lewis at bedside, informed pt that he will be transferring to [facility]. Will continue to monitor. [facility]: Pt is AOX4, c/o headache at this time, denies any dizziness or changes in vision at this time. Pt is on Nicardipine Gtt at 5ml/hr. MD Lewis at bedside, informed pt that he will be transferring to [facility]. Will continue to monitor. [facility]: Pt is AOX4, c/o headache at this time, denies any dizziness or changes in vision at this time. Pt is on Nicardipine Gtt at 5mg/hr. MD Lewis at bedside, informed pt that he will be transferring to [facility]. Will continue to monitor. [facility]

## 2021-06-12 NOTE — PROGRESS NOTE ADULT - SUBJECTIVE AND OBJECTIVE BOX
NSCU ATTENDING -- ADDITIONAL PROGRESS NOTE    Nighttime rounds were performed -- please refer to earlier Progress Note for HPI details.    T(C): 37 (06-12-21 @ 19:00), Max: 37.1 (06-12-21 @ 16:30)  HR: 68 (06-12-21 @ 22:20) (68 - 127)  BP: 128/78 (06-12-21 @ 18:58) (126/87 - 234/147)  RR: 11 (06-12-21 @ 22:20) (11 - 25)  SpO2: 100% (06-12-21 @ 22:20) (94% - 100%)  Wt(kg): --    Relevant labwork and imaging reviewed.    Patient remains critically ill.    S/p angio this evening, no vascular abnormality noted.  Needs MR brain/c-spine.  Likely for repeat angiography in the next week or so.  Waking up from anesthesia now, safeguard on R groin, SBP < 160, wean cardene as tolerated.    Additional 35 minutes of critical care time.

## 2021-06-12 NOTE — CONSULT NOTE ADULT - ATTENDING COMMENTS
SAH with concerning contrast enhancement in the prepontine cistern, no definitive aneurysm. Angio today.

## 2021-06-12 NOTE — H&P ADULT - ASSESSMENT
65 year old male presents with headache and neck pain, found to have SAH HH2 MF2. CTA negative, pending cerebral angiogram     NEURO:   Plan for Angio   Neuro checks Q1 hour   Keppra for seizure ppx    Monitor TCDs   Start Nimodipine for vasospasm ppx     PULM: Encouraged incentive spirometer     CV: Keep -140 until angiogram   Continue Nicardipine in the acute setting and then titrate using PO meds if needd   HLD start statin, F/u lipid panel      ENDO: F/u Hb A1c and thyroid panel     HEME/ONC:                      DVT ppx: F/u after angio      RENAL: NS@75     ID: Afebrile     GI:  Miralax and senna for GI ppx     DISCHARGE PLANNING: PT in a few days   Will D/w Neurosurgeon        65 year old male with PMHx of HTN, presents with headache and neck pain, found to have SAH HH2 MF2. CTA negative, pending cerebral angiogram     NEURO:   Plan for Angio   Neuro checks Q1 hour, hydro watch   Keppra for seizure ppx    Monitor TCDs   Start Nimodipine for vasospasm ppx     PULM: Encouraged incentive spirometer     CV: Keep -140 until angiogram   Continue Nicardipine in the acute setting and then titrate using PO meds if needd   HLD start statin, F/u lipid panel    Nimodipine     ENDO: F/u Hb A1c and thyroid panel     HEME/ONC:                      DVT ppx: F/u after angio      RENAL: NS@75     ID: Afebrile     GI:  Miralax and senna for GI ppx     DISCHARGE PLANNING: PT in a few days   Will D/w Neurosurgeon

## 2021-06-12 NOTE — PROCEDURE NOTE - NSINDICATIONS_GEN_A_CORE
SAH SBP <140 on cardene/arterial puncture to obtain ABG's/cannulation purposes/critical patient/monitoring purposes

## 2021-06-12 NOTE — DISCHARGE NOTE NURSING/CASE MANAGEMENT/SOCIAL WORK - PATIENT PORTAL LINK FT
You can access the FollowMyHealth Patient Portal offered by [facility] by registering at the following website: [email or web address]. By joining [employer]’s FollowMyHealth portal, you will also be able to view your health information using other applications (apps) compatible with our system.

## 2021-06-12 NOTE — PRE-ANESTHESIA EVALUATION ADULT - NSANTHPMHFT_GEN_ALL_CORE
65 year old male with PMHx of HTN (doesn't remember his home meds) presented with severe HA, neck pain, and palpitations. CTA showed CT angiography neck showed occlusion of the right internal carotid artery. No other significant medical hx.

## 2021-06-12 NOTE — ED PROVIDER NOTE - ATTENDING CONTRIBUTION TO CARE
65M h/o HTN p/w headache starting at 1030 worsening over an hour to 1130 at which point it was severe.  At that time pt had slurred speech which improved and is now fine.  Still having HA.  Never happened before.  USOH prior to event.  No LOC or vomiting.  Pt malaised mild distress HA.  Speech is fluent.  face symmetric strength equal and full x 4 extrems.  Given in the window TIA stroke code called.  Poss ICH will check CTH.  D/w neuro at 115pm.  VS:  unremarkable except HTN    GEN - malaise HTN;   A+O x3   HEAD - NC/AT     ENT - PEERL, EOMI, mucous membranes    moist , no discharge      NECK: Neck supple, non-tender without lymphadenopathy, no masses, no JVD  PULM - CTA b/l,  symmetric breath sounds  COR -  normal heart sounds    ABD - , ND, NT, soft,  BACK - no CVA tenderness, nontender spine     EXTREMS - no edema, no deformity, warm and well perfused    SKIN - no rash    or bruising      NEUROLOGIC - alert, face symmetric, speech fluent, sensation nl, motor no focal deficit. 65M h/o HTN p/w headache starting at 1030 worsening over an hour to 1130 at which point it was severe.  At that time pt had slurred speech which improved and is now fine.  Still having HA.  Never happened before.  USOH prior to event.  No LOC or vomiting.  Pt malaised mild distress HA.  Speech is fluent.  face symmetric strength equal and full x 4 extrems.  Given in the window TIA stroke code called.  Poss ICH will check CTH.  D/w neuro at 115pm.  VS:  unremarkable except HTN    GEN - malaise HTN;   A+O x3   HEAD - NC/AT     ENT - PEERL, EOMI, mucous membranes    moist , no discharge      NECK: Neck supple, non-tender without lymphadenopathy, no masses, no JVD  PULM - CTA b/l,  symmetric breath sounds  COR -  normal heart sounds    ABD - , ND, NT, soft,  BACK - no CVA tenderness, nontender spine     EXTREMS - no edema, no deformity, warm and well perfused    SKIN - no rash    or bruising      NEUROLOGIC - alert, face symmetric, speech fluent, sensation nl, motor no focal deficit.    Upon my evaluation, this patient had a high probability of imminent or life-threatening deterioration due to subarachnoid hemorrhage, which required my direct attention, intervention, and personal management.  The patient has a  medical condition that impairs one or more vital organ systems.  Frequent personal assessment and adjustment of medical interventions was performed.      I have personally provided 60 minutes of critical care time exclusive of time spent on separately billable procedures. Time includes review of laboratory data, radiology results, discussion with consultants, patient and family; monitoring for potential decompensation, as well as time spent retrieving data and reviewing the chart and documenting the visit. Interventions were performed as documented above.

## 2021-06-12 NOTE — ED PROVIDER NOTE - CARE PLAN
Principal Discharge DX:	Subarachnoid hemorrhage   Principal Discharge DX:	Subarachnoid hemorrhage  Secondary Diagnosis:	Hypertensive emergency

## 2021-06-12 NOTE — CONSULT NOTE ADULT - ASSESSMENT
66yo R-handed man with PMH of HTN presents to the ED with a severe headache, nausea, and palpitations since this morning. LKN 1030h. In the ED code stroke was activated. Physical exam remarkable for severe HTN but otherwise shows no focal neurological deficit at this time. Labs grossly WNL. NIHSS 0. Pre-MRS 0. CTH shows a subarachnoid hemorrhage. ICH score 1. CTA head/neck shows occlusion of the R ICA starting at the carotid bifurcation and a hypoattenuating R thyroid gland. The contrast enhancement of the vascular structure in the L perimesencephalic cistern does not have the appearance of a P-comm aneurysm. Patient was not a candidate for tPA or thrombectomy due to the subarachnoid hemorrhage.    Impression: Acute severe headache due to prepontine SAH likely due to hypertension, r/o underlying aneurysm.    Recommendations:  [] Neurosurgery consult  [] BP < 160/90  [] CBC, PT/PTT, Fibrinogen, CMP, Type and Screen, Urine Toxicology Screen   [] Chest X-ray  [] MRI brain w/wo contrast  [] Will need conventional angiogram  [] CTH 4H, 24 H  [] If neurological examination deteriorates, obtain repeat head CT scan  [] Keep T< 38.0° C (100.1° F) with acetaminophen and/or cooling blanket  [] BG  ml/dL  [] Hypertonic saline (2% or 3%) to target serum sodium 145-155mEq/L  [] Elevate head of bed to 30º  [] SS    Case to be seen and discussed with neurology attending Dr. Schoenberg.  66yo R-handed man with PMH of HTN presents to the ED with a severe headache, nausea, and palpitations since this morning. LKN 1030h. In the ED code stroke was activated. Physical exam remarkable for severe HTN but otherwise shows no focal neurological deficit at this time. Labs grossly WNL. NIHSS 0. Pre-MRS 0. CTH shows a subarachnoid hemorrhage. ICH score 1. CTA head/neck shows occlusion of the R ICA starting at the carotid bifurcation and a hypoattenuating R thyroid gland. The contrast enhancement of the vascular structure in the L perimesencephalic cistern does not have the appearance of a P-comm aneurysm. Patient was not a candidate for tPA or thrombectomy due to the subarachnoid hemorrhage.    Impression: Acute severe headache due to prepontine SAH likely due to hypertension, r/o underlying aneurysm.    Recommendations:  [] Neurosurgery consult  [] BP < 160/90  [] CBC, PT/PTT, Fibrinogen, CMP, Type and Screen, Urine Toxicology Screen   [] Chest X-ray  [] MRI brain w/wo contrast  [] Will need conventional angiogram  [] CTH 4H, 24 H  [] If neurological examination deteriorates, obtain repeat head CT scan  [] Keep T< 38.0° C (100.1° F) with acetaminophen and/or cooling blanket  [] BG  ml/dL  [] Hypertonic saline (2% or 3%) to target serum sodium 145-155mEq/L  [] Elevate head of bed to 30º  [] Neuro checks q1h  [] SS    Case to be seen and discussed with neurology attending Dr. Schoenberg.

## 2021-06-12 NOTE — CONSULT NOTE ADULT - SUBJECTIVE AND OBJECTIVE BOX
p ([id number])     HPI:  65 year old male with PMHx of HTN (doesn't remember his home meds) presented on the morning od admission with severe HA, neck pain, and palpitations. At 10:30am he had palps then at 11:30am while working, he developed nausea, dizziness, and neck pain. Noted to be hypertensive on arrival to the hospital (205/123).   CT head and CTA showed Hyperattenuation in the prepontine cistern, left ambient cistern, and left lateral portion of the suprasellar cistern compatible with subarachnoid hemorrhage. 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  CT angiography brain was negative. Recommended for further angiogram evaluation.     Exam:  AOx3, FC, PERRL, EOMI, no facial, 5/5 throughout, no drift    --Anticoagulation:    =====================  PAST MEDICAL HISTORY   No pertinent past medical history      PAST SURGICAL HISTORY   No significant past surgical history          MEDICATIONS:  Antibiotics:    Neuro:  acetaminophen   Tablet .. 650 milliGRAM(s) Oral every 6 hours PRN  levETIRAcetam 500 milliGRAM(s) Oral two times a day    Other:  niCARdipine Infusion 10 mG/Hr IV Continuous <Continuous>  niMODipine 60 milliGRAM(s) Oral every 4 hours  polyethylene glycol 3350 17 Gram(s) Oral daily  senna 2 Tablet(s) Oral at bedtime  sodium chloride 0.9%. 1000 milliLiter(s) IV Continuous <Continuous>      SOCIAL HISTORY:   Occupation:   Marital Status:     FAMILY HISTORY:  No pertinent family history in first degree relatives        ROS: Negative except per HPI    LABS:  PT/INR - ( 12 Jun 2021 13:30 )   PT: 11.8 sec;   INR: 1.03 ratio         PTT - ( 12 Jun 2021 13:30 )  PTT:30.2 sec                        16.5   5.20  )-----------( 195      ( 12 Jun 2021 13:30 )             49.0     06-12    136  |  100  |  15  ----------------------------<  115<H>  4.2   |  23  |  0.83    Ca    9.8      12 Jun 2021 13:30    TPro  7.3  /  Alb  4.5  /  TBili  0.3  /  DBili  x   /  AST  31  /  ALT  38  /  AlkPhos  118  06-12

## 2021-06-12 NOTE — CONSULT NOTE ADULT - PROBLEM SELECTOR RECOMMENDATION 9
1. case to be d/w attending 1. case to be d/w attending  2. BP control with cardene drip  3. hold all blood thinners

## 2021-06-12 NOTE — ED PROVIDER NOTE - PROGRESS NOTE DETAILS
DD ED ATTG:  pt feeling ok still some HA will rx ofirmev.  Still NPO.  D/w Nsx req xfer to [facility] under Dr Bell.  Will d/w xfer ctr. DD ED ATTG:  Found to have SAH.  pt feeling ok still some HA will rx ofirmev.  Still NPO.  D/w Nsx req xfer to [facility] under Dr Bell.  Will d/w xfer ctr.  Nam gtt started goal 140-160 SBP.  Not on AC. DD ED ATTG:  Found to have SAH.  pt feeling ok still some HA will rx ofirmev.  Still NPO.  D/w Nsx req xfer to [facility] under Dr Bell.  Will d/w xfer ctr.  Cardene gtt started goal 140-160 SBP.  Not on AC.  [facility]

## 2021-06-12 NOTE — ED ADULT NURSE REASSESSMENT NOTE - NS ED NURSE REASSESS COMMENT FT1
ER pt return from CT for Code stroke, circulating RN at bed side, pt pt AOX 3 pt stated symptoms stated early today with blur vision, numbness to an. neck area, some palpitation, pt maintained BR, on CM with NSR hypertensive, BP  234/147 lungs clear, resp. equal, 18-20b/min,      Report off to primary RN

## 2021-06-12 NOTE — CHART NOTE - NSCHARTNOTEFT_GEN_A_CORE
Interventional Neuro Radiology  Pre-Procedure Note    This is a 65y ____ hand dominant Male      HPI:  65 year old male with PMHx of HTN (doesn't remember his home meds) presented on the morning od admission with severe HA, neck pain, and palpitations. At 10:30am he had palps then at 11:30am while working, he developed nausea, dizziness, and neck pain. Noted to be hypertensive on arrival to the hospital (205/123).   CT head and CTA showed Hyperattenuation in the prepontine cistern, left ambient cistern, and left lateral portion of the suprasellar cistern compatible with subarachnoid hemorrhage. 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  CT angiography brain was negative. Recommended for further angiogram evaluation.            (12 Jun 2021 16:42)      Neuro Exam: Awake and alert, oriented x3, fluent, normal naming and repetition, follows 3 step commands. Extraocular movements intact, no nystagmus, visual fields full, face symmetric, tongue midline. No drift, 5/5 power x 4 extremities. Normal sensation to LT. Normal finger-to-nose and rapid alternating movements.    PAST MEDICAL & SURGICAL HISTORY:  No pertinent past medical history    No significant past surgical history        Social History:   Denies tobacco use    FAMILY HISTORY:  No pertinent family history in first degree relatives      No pertinent family history    Allergies: No Known Allergies      Current Medications: acetaminophen   Tablet .. 650 milliGRAM(s) Oral every 6 hours PRN  chlorhexidine 4% Liquid 1 Application(s) Topical <User Schedule>  levETIRAcetam 500 milliGRAM(s) Oral two times a day  magnesium sulfate  IVPB 1 Gram(s) IV Intermittent once  niCARdipine Infusion 10 mG/Hr IV Continuous <Continuous>  niMODipine 60 milliGRAM(s) Oral every 4 hours  polyethylene glycol 3350 17 Gram(s) Oral daily  potassium chloride    Tablet ER 40 milliEquivalent(s) Oral once  senna 2 Tablet(s) Oral at bedtime  sodium chloride 0.9%. 1000 milliLiter(s) IV Continuous <Continuous>      Labs:                         16.5   5.20  )-----------( 195      ( 12 Jun 2021 13:30 )             49.0       06-12    137  |  101  |  11  ----------------------------<  131<H>  3.6   |  21<L>  |  0.67    Ca    9.4      12 Jun 2021 18:54  Phos  2.9     06-12  Mg     1.7     06-12    TPro  7.3  /  Alb  4.5  /  TBili  0.3  /  DBili  x   /  AST  31  /  ALT  38  /  AlkPhos  118  06-12        HCG:     Blood Bank: 06-12-21  B  --  Positive      Assessment/Plan:   This is a 65y ____ hand dominant Male  presents with ______. Patient presents to neuro-IR for selective cerebral angiography. Procedure/ risks/ benefits/ goals/ alternatives were explained. Risks include but are not limited to stroke/ vessel injury/ hemorrhage/ groin hematoma. All questions answered. Informed content obtained from patient____. Consent placed in chart.

## 2021-06-12 NOTE — ED ADULT NURSE NOTE - OBJECTIVE STATEMENT
[place] rn: pt received to room #29, with c/o intermittent dizziness, slurred speech and weakness that happened at 10:30 this morning. +rom and sensation to b/l upper and lower extremities. pt aox4, speech is clear. denies cp, sob, n/v/d. IV placed, labs drawn and sent, pt seen by MD Sanders, code stroke called, pt brought directly to CT. report given to primary RN.

## 2021-06-13 LAB
A1C WITH ESTIMATED AVERAGE GLUCOSE RESULT: 5.6 % — SIGNIFICANT CHANGE UP (ref 4–5.6)
ANION GAP SERPL CALC-SCNC: 19 MMOL/L — HIGH (ref 5–17)
BUN SERPL-MCNC: 27 MG/DL — HIGH (ref 7–23)
CALCIUM SERPL-MCNC: 9.6 MG/DL — SIGNIFICANT CHANGE UP (ref 8.4–10.5)
CHLORIDE SERPL-SCNC: 103 MMOL/L — SIGNIFICANT CHANGE UP (ref 96–108)
CHOLEST SERPL-MCNC: 206 MG/DL — HIGH
CO2 SERPL-SCNC: 17 MMOL/L — LOW (ref 22–31)
COVID-19 SPIKE DOMAIN AB INTERP: POSITIVE
COVID-19 SPIKE DOMAIN ANTIBODY RESULT: >250 U/ML — HIGH
CREAT SERPL-MCNC: 1.34 MG/DL — HIGH (ref 0.5–1.3)
ESTIMATED AVERAGE GLUCOSE: 114 MG/DL — SIGNIFICANT CHANGE UP (ref 68–114)
GLUCOSE SERPL-MCNC: 125 MG/DL — HIGH (ref 70–99)
HCT VFR BLD CALC: 47.1 % — SIGNIFICANT CHANGE UP (ref 39–50)
HCV AB S/CO SERPL IA: 0.08 S/CO — SIGNIFICANT CHANGE UP (ref 0–0.99)
HCV AB SERPL-IMP: SIGNIFICANT CHANGE UP
HDLC SERPL-MCNC: 47 MG/DL — SIGNIFICANT CHANGE UP
HGB BLD-MCNC: 16.1 G/DL — SIGNIFICANT CHANGE UP (ref 13–17)
LIPID PNL WITH DIRECT LDL SERPL: 137 MG/DL — HIGH
MAGNESIUM SERPL-MCNC: 2 MG/DL — SIGNIFICANT CHANGE UP (ref 1.6–2.6)
MCHC RBC-ENTMCNC: 31.7 PG — SIGNIFICANT CHANGE UP (ref 27–34)
MCHC RBC-ENTMCNC: 34.2 GM/DL — SIGNIFICANT CHANGE UP (ref 32–36)
MCV RBC AUTO: 92.7 FL — SIGNIFICANT CHANGE UP (ref 80–100)
NON HDL CHOLESTEROL: 159 MG/DL — HIGH
NRBC # BLD: 0 /100 WBCS — SIGNIFICANT CHANGE UP (ref 0–0)
PHOSPHATE SERPL-MCNC: 4.1 MG/DL — SIGNIFICANT CHANGE UP (ref 2.5–4.5)
PLATELET # BLD AUTO: 221 K/UL — SIGNIFICANT CHANGE UP (ref 150–400)
POTASSIUM SERPL-MCNC: 4 MMOL/L — SIGNIFICANT CHANGE UP (ref 3.5–5.3)
POTASSIUM SERPL-SCNC: 4 MMOL/L — SIGNIFICANT CHANGE UP (ref 3.5–5.3)
RBC # BLD: 5.08 M/UL — SIGNIFICANT CHANGE UP (ref 4.2–5.8)
RBC # FLD: 12.4 % — SIGNIFICANT CHANGE UP (ref 10.3–14.5)
SARS-COV-2 IGG+IGM SERPL QL IA: >250 U/ML — HIGH
SARS-COV-2 IGG+IGM SERPL QL IA: POSITIVE
SODIUM SERPL-SCNC: 139 MMOL/L — SIGNIFICANT CHANGE UP (ref 135–145)
T3 SERPL-MCNC: 118 NG/DL — SIGNIFICANT CHANGE UP (ref 80–200)
T4 AB SER-ACNC: 7.6 UG/DL — SIGNIFICANT CHANGE UP (ref 4.6–12)
TRIGL SERPL-MCNC: 106 MG/DL — SIGNIFICANT CHANGE UP
TSH SERPL-MCNC: 1.67 UIU/ML — SIGNIFICANT CHANGE UP (ref 0.27–4.2)
WBC # BLD: 16.49 K/UL — HIGH (ref 3.8–10.5)
WBC # FLD AUTO: 16.49 K/UL — HIGH (ref 3.8–10.5)

## 2021-06-13 PROCEDURE — 93970 EXTREMITY STUDY: CPT | Mod: 26

## 2021-06-13 PROCEDURE — 72156 MRI NECK SPINE W/O & W/DYE: CPT | Mod: 26

## 2021-06-13 PROCEDURE — 70553 MRI BRAIN STEM W/O & W/DYE: CPT | Mod: 26

## 2021-06-13 PROCEDURE — 70450 CT HEAD/BRAIN W/O DYE: CPT | Mod: 26

## 2021-06-13 PROCEDURE — 99291 CRITICAL CARE FIRST HOUR: CPT

## 2021-06-13 RX ADMIN — LEVETIRACETAM 500 MILLIGRAM(S): 250 TABLET, FILM COATED ORAL at 18:28

## 2021-06-13 RX ADMIN — CHLORHEXIDINE GLUCONATE 1 APPLICATION(S): 213 SOLUTION TOPICAL at 22:31

## 2021-06-13 RX ADMIN — NIMODIPINE 60 MILLIGRAM(S): 60 SOLUTION ORAL at 10:25

## 2021-06-13 RX ADMIN — NIMODIPINE 60 MILLIGRAM(S): 60 SOLUTION ORAL at 18:28

## 2021-06-13 RX ADMIN — NIMODIPINE 60 MILLIGRAM(S): 60 SOLUTION ORAL at 13:19

## 2021-06-13 RX ADMIN — LEVETIRACETAM 500 MILLIGRAM(S): 250 TABLET, FILM COATED ORAL at 06:02

## 2021-06-13 RX ADMIN — NIMODIPINE 60 MILLIGRAM(S): 60 SOLUTION ORAL at 06:02

## 2021-06-13 RX ADMIN — Medication 100 GRAM(S): at 02:27

## 2021-06-13 RX ADMIN — NIMODIPINE 60 MILLIGRAM(S): 60 SOLUTION ORAL at 22:09

## 2021-06-13 RX ADMIN — SENNA PLUS 2 TABLET(S): 8.6 TABLET ORAL at 22:09

## 2021-06-13 RX ADMIN — Medication 40 MILLIEQUIVALENT(S): at 02:27

## 2021-06-13 RX ADMIN — NIMODIPINE 60 MILLIGRAM(S): 60 SOLUTION ORAL at 02:26

## 2021-06-13 RX ADMIN — POLYETHYLENE GLYCOL 3350 17 GRAM(S): 17 POWDER, FOR SOLUTION ORAL at 12:12

## 2021-06-13 NOTE — OCCUPATIONAL THERAPY INITIAL EVALUATION ADULT - PRECAUTIONS/LIMITATIONS, REHAB EVAL
65 year old male with PMHx of HTN (doesn't remember his home meds) presented on the morning od admission with severe HA, neck pain, and palpitations. At 10:30am he had palps then at 11:30am while working, he developed nausea, dizziness, and neck pain. Noted to be hypertensive on arrival to the hospital (205/123).   CT head and CTA showed Hyperattenuation in the prepontine cistern, left ambient cistern, and left lateral portion of the suprasellar cistern compatible with subarachnoid hemorrhage. 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  CT angiography brain was negative

## 2021-06-13 NOTE — PHYSICAL THERAPY INITIAL EVALUATION ADULT - PERTINENT HX OF CURRENT PROBLEM, REHAB EVAL
64 yo M PMHx of HTN presented with severe HA, neck pain, and palpitations. At 10:30am he had palps then at 11:30am while working, he developed nausea, dizziness, and neck pain. Noted to be hypertensive on arrival to the hospital (205/123).

## 2021-06-13 NOTE — PHYSICAL THERAPY INITIAL EVALUATION ADULT - PLANNED THERAPY INTERVENTIONS, PT EVAL
LTG 1: Stairs - Pt will be independent with negotiation of 4 steps within 4 weeks./balance training/gait training/transfer training

## 2021-06-13 NOTE — SPEECH LANGUAGE PATHOLOGY EVALUATION - SLP PERTINENT HISTORY OF CURRENT PROBLEM
65 year old male with PMHx of HTN (doesn't remember his home meds) presented on the morning od admission with severe HA, neck pain, and palpitations. At 10:30am he had palps then at 11:30am while working, he developed nausea, dizziness, and neck pain. Noted to be hypertensive on arrival to the hospital (205/123).   CT head and CTA showed Hyperattenuation in the prepontine cistern, left ambient cistern, and left lateral portion of the suprasellar cistern compatible with subarachnoid hemorrhage. 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  CT angiography brain was negative. Recommended for further angiogram evaluation.

## 2021-06-13 NOTE — PHYSICAL THERAPY INITIAL EVALUATION ADULT - PRECAUTIONS/LIMITATIONS, REHAB EVAL
CT head and CTA showed Hyperattenuation in the prepontine cistern, left ambient cistern, and left lateral portion of the suprasellar cistern compatible with subarachnoid hemorrhage. 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 CT angiography brain was negative. Recommended for further angiogram evaluation. CT head and CTA showed Hyperattenuation in the prepontine cistern, left ambient cistern, and left lateral portion of the suprasellar cistern compatible with subarachnoid hemorrhage. 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 CT angiography brain was negative. Recommended for further angiogram evaluation./no known precautions/limitations

## 2021-06-13 NOTE — PROGRESS NOTE ADULT - SUBJECTIVE AND OBJECTIVE BOX
ICU Vital Signs Last 24 Hrs  T(C): 36.5 (13 Jun 2021 03:00), Max: 37.1 (12 Jun 2021 16:30)  T(F): 97.7 (13 Jun 2021 03:00), Max: 98.7 (12 Jun 2021 16:30)  HR: 75 (13 Jun 2021 05:00) (68 - 127)  BP: 128/78 (12 Jun 2021 18:58) (126/87 - 234/147)  BP(mean): 91 (12 Jun 2021 18:58) (75 - 129)  ABP: 117/94 (13 Jun 2021 05:00) (100/53 - 140/88)  ABP(mean): 100 (13 Jun 2021 05:00) (70 - 109)  RR: 17 (13 Jun 2021 05:00) (11 - 27)  SpO2: 95% (13 Jun 2021 05:00) (92% - 100%)      06-12-21 @ 07:01  -  06-13-21 @ 06:09  --------------------------------------------------------  IN: 1371.9 mL / OUT: 1350 mL / NET: 21.9 mL            acetaminophen   Tablet .. 650 milliGRAM(s) Oral every 6 hours PRN  chlorhexidine 4% Liquid 1 Application(s) Topical <User Schedule>  levETIRAcetam 500 milliGRAM(s) Oral two times a day  niCARdipine Infusion 10 mG/Hr (50 mL/Hr) IV Continuous <Continuous>  niMODipine 60 milliGRAM(s) Oral every 4 hours  polyethylene glycol 3350 17 Gram(s) Oral daily  senna 2 Tablet(s) Oral at bedtime  sodium chloride 0.9%. 1000 milliLiter(s) (75 mL/Hr) IV Continuous <Continuous>      LABS:  Na: 137 (06-12 @ 18:54), 136 (06-12 @ 13:30)  K: 3.6 (06-12 @ 18:54), 4.2 (06-12 @ 13:30)  Cl: 101 (06-12 @ 18:54), 100 (06-12 @ 13:30)  CO2: 21 (06-12 @ 18:54), 23 (06-12 @ 13:30)  BUN: 11 (06-12 @ 18:54), 15 (06-12 @ 13:30)  Cr: 0.67 (06-12 @ 18:54), 0.83 (06-12 @ 13:30)  Glu: 131(06-12 @ 18:54), 115(06-12 @ 13:30)    Hgb: 16.5 (06-12 @ 13:30)  Hct: 49.0 (06-12 @ 13:30)  WBC: 5.20 (06-12 @ 13:30)  Plt: 195 (06-12 @ 13:30)    INR: 1.03 06-12-21 @ 13:30  PTT: 30.2 06-12-21 @ 13:30          LIVER FUNCTIONS - ( 12 Jun 2021 13:30 )  Alb: 4.5 g/dL / Pro: 7.3 g/dL / ALK PHOS: 118 U/L / ALT: 38 U/L / AST: 31 U/L / GGT: x               HPI:  65 year old man knonw to have HTN is presenting with severe headache. CT head showed SAH CTA no vascular malformation     EVENTS:   transferred to the [facility] for SAH workup and treatement                   PHYSICAL EXAM:    General: No Acute Distress     Neurological: Awake, alert oriented to person, place and time, Following Commands, right pupil 3 mm, left pupil 2 mm, EOMI, Face Symmetrical, Speech Fluent, Moving all extremities, Muscle Strength normal in all four extremities, No Drift, Sensation to Light Touch Intact    Pulmonary: Clear to Auscultation, No Rales, No Rhonchi, No Wheezes     Cardiovascular: S1, S2, Regular Rate and Rhythm     Gastrointestinal: Soft, Nontender, Nondistended     Extremities: No calf tenderness     Incision:     DEVICES: [] Restraints [] GÓMEZ/HMV []LD [] ET tube [] Trach [] Chest Tube [] A-line [] Tyson [] NGT [] Rectal Tube              O:   Angio done no vascular malformation     ICU Vital Signs Last 24 Hrs  T(C): 36.5 (13 Jun 2021 03:00), Max: 37.1 (12 Jun 2021 16:30)  T(F): 97.7 (13 Jun 2021 03:00), Max: 98.7 (12 Jun 2021 16:30)  HR: 75 (13 Jun 2021 05:00) (68 - 127)  BP: 128/78 (12 Jun 2021 18:58) (126/87 - 234/147)  BP(mean): 91 (12 Jun 2021 18:58) (75 - 129)  ABP: 117/94 (13 Jun 2021 05:00) (100/53 - 140/88)  ABP(mean): 100 (13 Jun 2021 05:00) (70 - 109)  RR: 17 (13 Jun 2021 05:00) (11 - 27)  SpO2: 95% (13 Jun 2021 05:00) (92% - 100%)      06-12-21 @ 07:01  -  06-13-21 @ 06:09  --------------------------------------------------------  IN: 1371.9 mL / OUT: 1350 mL / NET: 21.9 mL            acetaminophen   Tablet .. 650 milliGRAM(s) Oral every 6 hours PRN  chlorhexidine 4% Liquid 1 Application(s) Topical <User Schedule>  levETIRAcetam 500 milliGRAM(s) Oral two times a day  niCARdipine Infusion 10 mG/Hr (50 mL/Hr) IV Continuous <Continuous>  niMODipine 60 milliGRAM(s) Oral every 4 hours  polyethylene glycol 3350 17 Gram(s) Oral daily  senna 2 Tablet(s) Oral at bedtime  sodium chloride 0.9%. 1000 milliLiter(s) (75 mL/Hr) IV Continuous <Continuous>        LABS:  Na: 137 (06-12 @ 18:54), 136 (06-12 @ 13:30)  K: 3.6 (06-12 @ 18:54), 4.2 (06-12 @ 13:30)  Cl: 101 (06-12 @ 18:54), 100 (06-12 @ 13:30)  CO2: 21 (06-12 @ 18:54), 23 (06-12 @ 13:30)  BUN: 11 (06-12 @ 18:54), 15 (06-12 @ 13:30)  Cr: 0.67 (06-12 @ 18:54), 0.83 (06-12 @ 13:30)  Glu: 131(06-12 @ 18:54), 115(06-12 @ 13:30)    Hgb: 16.5 (06-12 @ 13:30)  Hct: 49.0 (06-12 @ 13:30)  WBC: 5.20 (06-12 @ 13:30)  Plt: 195 (06-12 @ 13:30)    INR: 1.03 06-12-21 @ 13:30  PTT: 30.2 06-12-21 @ 13:30                          PHYSICAL EXAM:    General: No Acute Distress     Neurological: Awake, alert oriented to person, place and time, Following Commands, right pupil 3 mm, left pupil 2 mm, EOMI, Face Symmetrical, Speech Fluent, Moving all extremities, Muscle Strength normal in all four extremities, No Drift, Sensation to Light Touch Intact    Pulmonary: Clear to Auscultation, No Rales, No Rhonchi, No Wheezes     Cardiovascular: S1, S2, Regular Rate and Rhythm     Gastrointestinal: Soft, Nontender, Nondistended     Extremities: No calf tenderness     Incision:     DEVICES: [] Restraints [] GÓMEZ/HMV []LD [] ET tube [] Trach [] Chest Tube [] A-line [] Tyson [] NGT [] Rectal Tube

## 2021-06-13 NOTE — PROGRESS NOTE ADULT - ASSESSMENT
A/P: SAH likely aneurysmal although CTA is neg  mFS 2, HHS 2  NIHSS 0   Neuro: neuro checks q 1 hr, CT head tomorrow morning, angio tomorrow, nimodipine, keppra 500 mg BID for seizure prophylaxis fentanyl PRN for agitation   Respiratory: RA  CV: NSR,  TTE, -140 mmhg, on nicardipine  Endocrine: finger sticks q6hrs, ISS , keep sugar 120-180 mmhg   Heme/Onc: INR <1.5, Plt>100            DVT ppx: SCD, contraindicated to start anticoagulant as she is PBD0   Renal: NS 75 ml/hr  ID: afebrile  GI: NPO, protonix, NG, colace   Social/Family: updated at bedside  Discharge planning: ICU    Code Status: [x] Full Code [] DNR [] DNI [] Goals of Care:   Disposition: [x] ICU [] Stroke Unit [] RCU []PCU []Floor [] Discharge Home     Patient at high risk for neurologic deterioration, aneurysm rerupture, seizures, ICP crisis, critical care time, excluding procedures: 40 minutes                 A/P: SAH likely aneurysmal although CTA is neg PBD 1   Angio negative   mFS 2, HHS 2  NIHSS 0   Neuro: neuro checks q 1 hr, CT head today stable with new IVH   MRI cervical spine and brain    nimodipine, keppra 500 mg BID for seizure prophylaxis fentanyl PRN for agitation   TCD   repeat angio in 1 week   Respiratory: RA  CV: NSR,  TTE, -150 mmhg   Endocrine:  keep sugar 120-180 mmhg   Heme/Onc: INR <1.5, Plt>100            DVT ppx: SCD, hold chemoprophylaxis until we get MRI brain    Renal: IVL   ID: afebrile  GI: regular diet , protonix, NG, colace   Social/Family: updated at bedside  Discharge planning: ICU    Code Status: [x] Full Code [] DNR [] DNI [] Goals of Care:   Disposition: [x] ICU [] Stroke Unit [] RCU []PCU []Floor [] Discharge Home     Patient at high risk for neurologic deterioration, aneurysm rerupture, seizures, ICP crisis, critical care time, excluding procedures: 40 minutes

## 2021-06-13 NOTE — SPEECH LANGUAGE PATHOLOGY EVALUATION - SLP DIAGNOSIS
Pt presents with receptive and expressive language skills deemed to be WNL.  Pt's speech is fluent with good articulatory precision.

## 2021-06-13 NOTE — PROGRESS NOTE ADULT - SUBJECTIVE AND OBJECTIVE BOX
NSCU ATTENDING -- ADDITIONAL PROGRESS NOTE    Nighttime rounds were performed -- please refer to earlier Progress Note for HPI details.    T(C): 36.8 (06-13-21 @ 19:00), Max: 37.1 (06-13-21 @ 11:00)  HR: 98 (06-13-21 @ 20:00) (68 - 107)  BP: --  RR: 24 (06-13-21 @ 20:00) (11 - 27)  SpO2: 94% (06-13-21 @ 20:00) (92% - 100%)  Wt(kg): --    Relevant labwork and imaging reviewed.    Intact.  MRI without vascular lesion.  No EVD.

## 2021-06-13 NOTE — SPEECH LANGUAGE PATHOLOGY EVALUATION - COMMENTS
SAH likely aneurysmal although CTA is neg; NIHSS 0 repetition of verbal stimuli required x 1 as Pt reported English as second language to be a barrier at times and stated that his daughter understands complex medical information, however Pt educated re; availability of  phone for Khmer if needed; Pt denied need at this time. Pt spoke of his children and business during conversation.  Pt also performed mental manipulation re: math calculations without difficulty

## 2021-06-14 LAB
ANION GAP SERPL CALC-SCNC: 14 MMOL/L — SIGNIFICANT CHANGE UP (ref 5–17)
BUN SERPL-MCNC: 26 MG/DL — HIGH (ref 7–23)
CALCIUM SERPL-MCNC: 9 MG/DL — SIGNIFICANT CHANGE UP (ref 8.4–10.5)
CHLORIDE SERPL-SCNC: 102 MMOL/L — SIGNIFICANT CHANGE UP (ref 96–108)
CO2 SERPL-SCNC: 20 MMOL/L — LOW (ref 22–31)
CREAT SERPL-MCNC: 0.95 MG/DL — SIGNIFICANT CHANGE UP (ref 0.5–1.3)
GLUCOSE SERPL-MCNC: 105 MG/DL — HIGH (ref 70–99)
HCT VFR BLD CALC: 45.3 % — SIGNIFICANT CHANGE UP (ref 39–50)
HGB BLD-MCNC: 15.3 G/DL — SIGNIFICANT CHANGE UP (ref 13–17)
MAGNESIUM SERPL-MCNC: 2 MG/DL — SIGNIFICANT CHANGE UP (ref 1.6–2.6)
MCHC RBC-ENTMCNC: 31.8 PG — SIGNIFICANT CHANGE UP (ref 27–34)
MCHC RBC-ENTMCNC: 33.8 GM/DL — SIGNIFICANT CHANGE UP (ref 32–36)
MCV RBC AUTO: 94.2 FL — SIGNIFICANT CHANGE UP (ref 80–100)
NRBC # BLD: 0 /100 WBCS — SIGNIFICANT CHANGE UP (ref 0–0)
PHOSPHATE SERPL-MCNC: 3.3 MG/DL — SIGNIFICANT CHANGE UP (ref 2.5–4.5)
PLATELET # BLD AUTO: 184 K/UL — SIGNIFICANT CHANGE UP (ref 150–400)
POTASSIUM SERPL-MCNC: 3.9 MMOL/L — SIGNIFICANT CHANGE UP (ref 3.5–5.3)
POTASSIUM SERPL-SCNC: 3.9 MMOL/L — SIGNIFICANT CHANGE UP (ref 3.5–5.3)
RBC # BLD: 4.81 M/UL — SIGNIFICANT CHANGE UP (ref 4.2–5.8)
RBC # FLD: 12.7 % — SIGNIFICANT CHANGE UP (ref 10.3–14.5)
SODIUM SERPL-SCNC: 136 MMOL/L — SIGNIFICANT CHANGE UP (ref 135–145)
WBC # BLD: 8.78 K/UL — SIGNIFICANT CHANGE UP (ref 3.8–10.5)
WBC # FLD AUTO: 8.78 K/UL — SIGNIFICANT CHANGE UP (ref 3.8–10.5)

## 2021-06-14 PROCEDURE — 93886 INTRACRANIAL COMPLETE STUDY: CPT | Mod: 26

## 2021-06-14 PROCEDURE — 76376 3D RENDER W/INTRP POSTPROCES: CPT | Mod: 26

## 2021-06-14 PROCEDURE — 93306 TTE W/DOPPLER COMPLETE: CPT | Mod: 26

## 2021-06-14 PROCEDURE — 70450 CT HEAD/BRAIN W/O DYE: CPT | Mod: 26

## 2021-06-14 PROCEDURE — 99291 CRITICAL CARE FIRST HOUR: CPT

## 2021-06-14 RX ORDER — POLYETHYLENE GLYCOL 3350 17 G/17G
17 POWDER, FOR SOLUTION ORAL
Refills: 0 | Status: DISCONTINUED | OUTPATIENT
Start: 2021-06-14 | End: 2021-06-22

## 2021-06-14 RX ORDER — LUTEIN 20 MG
0 CAPSULE ORAL
Qty: 0 | Refills: 0 | DISCHARGE

## 2021-06-14 RX ORDER — LOSARTAN POTASSIUM 100 MG/1
1 TABLET, FILM COATED ORAL
Qty: 0 | Refills: 0 | DISCHARGE

## 2021-06-14 RX ORDER — ASCORBIC ACID 60 MG
0 TABLET,CHEWABLE ORAL
Qty: 0 | Refills: 0 | DISCHARGE

## 2021-06-14 RX ORDER — SODIUM CHLORIDE 9 MG/ML
1000 INJECTION INTRAMUSCULAR; INTRAVENOUS; SUBCUTANEOUS ONCE
Refills: 0 | Status: COMPLETED | OUTPATIENT
Start: 2021-06-14 | End: 2021-06-14

## 2021-06-14 RX ORDER — SODIUM CHLORIDE 9 MG/ML
1000 INJECTION, SOLUTION INTRAVENOUS
Refills: 0 | Status: DISCONTINUED | OUTPATIENT
Start: 2021-06-14 | End: 2021-06-15

## 2021-06-14 RX ORDER — ENOXAPARIN SODIUM 100 MG/ML
40 INJECTION SUBCUTANEOUS
Refills: 0 | Status: DISCONTINUED | OUTPATIENT
Start: 2021-06-14 | End: 2021-06-22

## 2021-06-14 RX ORDER — PREGABALIN 225 MG/1
1 CAPSULE ORAL
Qty: 0 | Refills: 0 | DISCHARGE

## 2021-06-14 RX ORDER — SODIUM CHLORIDE 9 MG/ML
1000 INJECTION, SOLUTION INTRAVENOUS ONCE
Refills: 0 | Status: COMPLETED | OUTPATIENT
Start: 2021-06-14 | End: 2021-06-14

## 2021-06-14 RX ADMIN — SODIUM CHLORIDE 2000 MILLILITER(S): 9 INJECTION INTRAMUSCULAR; INTRAVENOUS; SUBCUTANEOUS at 00:15

## 2021-06-14 RX ADMIN — ENOXAPARIN SODIUM 40 MILLIGRAM(S): 100 INJECTION SUBCUTANEOUS at 17:05

## 2021-06-14 RX ADMIN — NIMODIPINE 60 MILLIGRAM(S): 60 SOLUTION ORAL at 06:00

## 2021-06-14 RX ADMIN — LEVETIRACETAM 500 MILLIGRAM(S): 250 TABLET, FILM COATED ORAL at 17:05

## 2021-06-14 RX ADMIN — NIMODIPINE 60 MILLIGRAM(S): 60 SOLUTION ORAL at 17:05

## 2021-06-14 RX ADMIN — NIMODIPINE 60 MILLIGRAM(S): 60 SOLUTION ORAL at 13:33

## 2021-06-14 RX ADMIN — LEVETIRACETAM 500 MILLIGRAM(S): 250 TABLET, FILM COATED ORAL at 06:00

## 2021-06-14 RX ADMIN — NIMODIPINE 60 MILLIGRAM(S): 60 SOLUTION ORAL at 02:04

## 2021-06-14 RX ADMIN — SODIUM CHLORIDE 75 MILLILITER(S): 9 INJECTION, SOLUTION INTRAVENOUS at 09:52

## 2021-06-14 RX ADMIN — CHLORHEXIDINE GLUCONATE 1 APPLICATION(S): 213 SOLUTION TOPICAL at 22:05

## 2021-06-14 RX ADMIN — SODIUM CHLORIDE 75 MILLILITER(S): 9 INJECTION, SOLUTION INTRAVENOUS at 22:05

## 2021-06-14 RX ADMIN — NIMODIPINE 60 MILLIGRAM(S): 60 SOLUTION ORAL at 22:05

## 2021-06-14 RX ADMIN — NIMODIPINE 60 MILLIGRAM(S): 60 SOLUTION ORAL at 09:29

## 2021-06-14 RX ADMIN — SODIUM CHLORIDE 4000 MILLILITER(S): 9 INJECTION, SOLUTION INTRAVENOUS at 09:52

## 2021-06-14 NOTE — CHART NOTE - NSCHARTNOTEFT_GEN_A_CORE
Transcranial doppler exam #1 (6/14/21) 1230pm  mean velocity  cm/sec                              Left         Right  LUIS CARLOS                    x            x  MCA                   x            x  PCA                     x           x  VERT                   49          53  BA                             54  Official report to follow.  Brandon Transcranial doppler exam #1 (6/14/21) 1230pm  mean velocity  cm/sec                              Left         Right  LUIS CARLOS                    x            x  MCA                   x            x  PCA                     x           x  VERT                   49          53  BA                             54  Technically difficult study.  No temporal windows bilaterally.  Official report to follow.  Brandon

## 2021-06-14 NOTE — PROGRESS NOTE ADULT - SUBJECTIVE AND OBJECTIVE BOX
NSCU ATTENDING -- ADDITIONAL PROGRESS NOTE    Nighttime rounds were performed -- please refer to earlier Progress Note for HPI details.    T(C): 36.8 (06-14-21 @ 19:00), Max: 36.9 (06-14-21 @ 15:00)  HR: 83 (06-14-21 @ 22:00) (70 - 94)  BP: 139/94 (06-14-21 @ 22:00) (113/74 - 150/99)  RR: 23 (06-14-21 @ 22:00) (14 - 25)  SpO2: 92% (06-14-21 @ 22:00) (92% - 98%)  Wt(kg): --    Relevant labwork and imaging reviewed.    Intact, angio negative, MR negative, no EVD, walking around unit.  Can be q4 neuro checks overnight.

## 2021-06-14 NOTE — PROGRESS NOTE ADULT - SUBJECTIVE AND OBJECTIVE BOX
65 year old male with PMHx of HTN (doesn't remember his home meds) presented on the morning od admission with severe HA, neck pain, and palpitations. At 10:30am he had palpitations then at 11:30am while working, he developed nausea, dizziness, and neck pain. Noted to be hypertensive on arrival to the hospital (205/123).  CT head and CTA showed Hyperattenuation in the prepontine cistern, left ambient cistern, and left lateral portion of the suprasellar cistern compatible with subarachnoid hemorrhage. 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  CT angiography brain was negative. Recommended for further angiogram evaluation.       Angio done: no vascular malformation.      ICU Vital Signs Last 24 Hrs  T(C): 36.2 (14 Jun 2021 03:00), Max: 37.1 (13 Jun 2021 11:00)  T(F): 97.2 (14 Jun 2021 03:00), Max: 98.7 (13 Jun 2021 11:00)  HR: 90 (14 Jun 2021 06:00) (70 - 112)  BP: 138/90 (14 Jun 2021 06:00) (113/74 - 145/100)  BP(mean): 105 (14 Jun 2021 06:00) (85 - 111)  ABP: 93/77 (13 Jun 2021 21:00) (93/77 - 160/82)  ABP(mean): 85 (13 Jun 2021 21:00) (74 - 114)  RR: 20 (14 Jun 2021 06:00) (14 - 25)  SpO2: 98% (14 Jun 2021 06:00) (92% - 98%)      06-12-21 @ 07:01  -  06-13-21 @ 07:00  --------------------------------------------------------  IN: 1496.9 mL / OUT: 1350 mL / NET: 146.9 mL    06-13-21 @ 07:01  -  06-14-21 @ 06:35  --------------------------------------------------------  IN: 1895 mL / OUT: 2900 mL / NET: -1005 mL        acetaminophen   Tablet .. 650 milliGRAM(s) Oral every 6 hours PRN  chlorhexidine 4% Liquid 1 Application(s) Topical <User Schedule>  levETIRAcetam 500 milliGRAM(s) Oral two times a day  niMODipine 60 milliGRAM(s) Oral every 4 hours  polyethylene glycol 3350 17 Gram(s) Oral daily  senna 2 Tablet(s) Oral at bedtime                          16.1   16.49 )-----------( 221      ( 13 Jun 2021 22:27 )             47.1     06-13    139  |  103  |  27<H>  ----------------------------<  125<H>  4.0   |  17<L>  |  1.34<H>    Ca    9.6      13 Jun 2021 22:27  Phos  4.1     06-13  Mg     2.0     06-13    TPro  7.3  /  Alb  4.5  /  TBili  0.3  /  DBili  x   /  AST  31  /  ALT  38  /  AlkPhos  118  06-12    LIVER FUNCTIONS - ( 12 Jun 2021 13:30 )  Alb: 4.5 g/dL / Pro: 7.3 g/dL / ALK PHOS: 118 U/L / ALT: 38 U/L / AST: 31 U/L / GGT: x               PHYSICAL EXAM:    General: No Acute Distress   Neurological: Awake, alert oriented to person, place and time, Following Commands, right pupil 3 mm, left pupil 2 mm, EOMI, Face Symmetrical, Speech Fluent, Moving all extremities, Muscle Strength normal in all four extremities, No Drift, Sensation to Light Touch Intact  Pulmonary: Clear to Auscultation, No Rales, No Rhonchi, No Wheezes   Cardiovascular: S1, S2, Regular Rate and Rhythm   Gastrointestinal: Soft, Nontender, Nondistended   Extremities: No calf tenderness     Incision:     DEVICES: [] Restraints [] GÓMEZ/HMV []LD [] ET tube [] Trach [] Chest Tube [] A-line [] Tyson [] NGT [] Rectal Tube          65 year old male with PMHx of HTN (doesn't remember his home meds) presented on the morning od admission with severe HA, neck pain, and palpitations. At 10:30am he had palpitations then at 11:30am while working, he developed nausea, dizziness, and neck pain. Noted to be hypertensive on arrival to the hospital (205/123).  CT head and CTA showed Hyperattenuation in the prepontine cistern, left ambient cistern, and left lateral portion of the suprasellar cistern compatible with subarachnoid hemorrhage. 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  CT angiography brain was negative. Recommended for further angiogram evaluation.       Angio done: no vascular malformation.      ICU Vital Signs Last 24 Hrs  T(C): 36.2 (14 Jun 2021 03:00), Max: 37.1 (13 Jun 2021 11:00)  T(F): 97.2 (14 Jun 2021 03:00), Max: 98.7 (13 Jun 2021 11:00)  HR: 90 (14 Jun 2021 06:00) (70 - 112)  BP: 138/90 (14 Jun 2021 06:00) (113/74 - 145/100)  BP(mean): 105 (14 Jun 2021 06:00) (85 - 111)  ABP: 93/77 (13 Jun 2021 21:00) (93/77 - 160/82)  ABP(mean): 85 (13 Jun 2021 21:00) (74 - 114)  RR: 20 (14 Jun 2021 06:00) (14 - 25)  SpO2: 98% (14 Jun 2021 06:00) (92% - 98%)      06-12-21 @ 07:01  -  06-13-21 @ 07:00  --------------------------------------------------------  IN: 1496.9 mL / OUT: 1350 mL / NET: 146.9 mL    06-13-21 @ 07:01  -  06-14-21 @ 06:35  --------------------------------------------------------  IN: 1895 mL / OUT: 2900 mL / NET: -1005 mL        acetaminophen   Tablet .. 650 milliGRAM(s) Oral every 6 hours PRN  chlorhexidine 4% Liquid 1 Application(s) Topical <User Schedule>  levETIRAcetam 500 milliGRAM(s) Oral two times a day  niMODipine 60 milliGRAM(s) Oral every 4 hours  polyethylene glycol 3350 17 Gram(s) Oral daily  senna 2 Tablet(s) Oral at bedtime               LABS:  Na: 139 (06-13 @ 22:27), 137 (06-12 @ 18:54), 136 (06-12 @ 13:30)  K: 4.0 (06-13 @ 22:27), 3.6 (06-12 @ 18:54), 4.2 (06-12 @ 13:30)  Cl: 103 (06-13 @ 22:27), 101 (06-12 @ 18:54), 100 (06-12 @ 13:30)  CO2: 17 (06-13 @ 22:27), 21 (06-12 @ 18:54), 23 (06-12 @ 13:30)  BUN: 27 (06-13 @ 22:27), 11 (06-12 @ 18:54), 15 (06-12 @ 13:30)  Cr: 1.34 (06-13 @ 22:27), 0.67 (06-12 @ 18:54), 0.83 (06-12 @ 13:30)  Glu: 125(06-13 @ 22:27), 131(06-12 @ 18:54), 115(06-12 @ 13:30)    Hgb: 16.1 (06-13 @ 22:27), 16.5 (06-12 @ 13:30)  Hct: 47.1 (06-13 @ 22:27), 49.0 (06-12 @ 13:30)  WBC: 16.49 (06-13 @ 22:27), 5.20 (06-12 @ 13:30)  Plt: 221 (06-13 @ 22:27), 195 (06-12 @ 13:30)    INR: 1.03 06-12-21 @ 13:30  PTT: 30.2 06-12-21 @ 13:30

## 2021-06-15 LAB
HCT VFR BLD CALC: 49 % — SIGNIFICANT CHANGE UP (ref 39–50)
HGB BLD-MCNC: 17 G/DL — SIGNIFICANT CHANGE UP (ref 13–17)
MCHC RBC-ENTMCNC: 31.9 PG — SIGNIFICANT CHANGE UP (ref 27–34)
MCHC RBC-ENTMCNC: 34.7 GM/DL — SIGNIFICANT CHANGE UP (ref 32–36)
MCV RBC AUTO: 91.9 FL — SIGNIFICANT CHANGE UP (ref 80–100)
NRBC # BLD: 0 /100 WBCS — SIGNIFICANT CHANGE UP (ref 0–0)
PLATELET # BLD AUTO: 199 K/UL — SIGNIFICANT CHANGE UP (ref 150–400)
RBC # BLD: 5.33 M/UL — SIGNIFICANT CHANGE UP (ref 4.2–5.8)
RBC # FLD: 12.2 % — SIGNIFICANT CHANGE UP (ref 10.3–14.5)
TROPONIN T, HIGH SENSITIVITY RESULT: 8 NG/L — SIGNIFICANT CHANGE UP (ref 0–51)
WBC # BLD: 7.01 K/UL — SIGNIFICANT CHANGE UP (ref 3.8–10.5)
WBC # FLD AUTO: 7.01 K/UL — SIGNIFICANT CHANGE UP (ref 3.8–10.5)

## 2021-06-15 PROCEDURE — 99231 SBSQ HOSP IP/OBS SF/LOW 25: CPT | Mod: GC

## 2021-06-15 PROCEDURE — 93010 ELECTROCARDIOGRAM REPORT: CPT

## 2021-06-15 PROCEDURE — 99291 CRITICAL CARE FIRST HOUR: CPT

## 2021-06-15 RX ORDER — POTASSIUM CHLORIDE 20 MEQ
20 PACKET (EA) ORAL ONCE
Refills: 0 | Status: COMPLETED | OUTPATIENT
Start: 2021-06-15 | End: 2021-06-15

## 2021-06-15 RX ADMIN — NIMODIPINE 60 MILLIGRAM(S): 60 SOLUTION ORAL at 09:17

## 2021-06-15 RX ADMIN — NIMODIPINE 60 MILLIGRAM(S): 60 SOLUTION ORAL at 22:00

## 2021-06-15 RX ADMIN — ENOXAPARIN SODIUM 40 MILLIGRAM(S): 100 INJECTION SUBCUTANEOUS at 17:29

## 2021-06-15 RX ADMIN — NIMODIPINE 60 MILLIGRAM(S): 60 SOLUTION ORAL at 06:01

## 2021-06-15 RX ADMIN — NIMODIPINE 60 MILLIGRAM(S): 60 SOLUTION ORAL at 17:30

## 2021-06-15 RX ADMIN — LEVETIRACETAM 500 MILLIGRAM(S): 250 TABLET, FILM COATED ORAL at 06:01

## 2021-06-15 RX ADMIN — NIMODIPINE 60 MILLIGRAM(S): 60 SOLUTION ORAL at 13:46

## 2021-06-15 RX ADMIN — NIMODIPINE 60 MILLIGRAM(S): 60 SOLUTION ORAL at 02:03

## 2021-06-15 RX ADMIN — Medication 20 MILLIEQUIVALENT(S): at 02:03

## 2021-06-15 NOTE — PROGRESS NOTE ADULT - SUBJECTIVE AND OBJECTIVE BOX
NSCU ATTENDING -- ADDITIONAL PROGRESS NOTE    Nighttime rounds were performed -- please refer to earlier Progress Note for HPI details.    T(C): 36.8 (06-16-21 @ 03:00), Max: 37.2 (06-15-21 @ 15:00)  HR: 64 (06-16-21 @ 05:00) (64 - 107)  BP: 137/94 (06-16-21 @ 05:00) (126/92 - 176/100)  RR: 16 (06-16-21 @ 05:00) (12 - 20)  SpO2: 95% (06-16-21 @ 05:00) (90% - 98%)  Wt(kg): --    Relevant labwork and imaging reviewed.    Very well appearing, conversant, intact.  Pending repeat angio.

## 2021-06-15 NOTE — PROVIDER CONTACT NOTE (OTHER) - BACKGROUND
Subarachnoid hemorrhage and R ICA occlusion, past medical history of hypertension
Subarachnoid hemorrhage

## 2021-06-15 NOTE — PROGRESS NOTE ADULT - SUBJECTIVE AND OBJECTIVE BOX
65 year old male with PMHx of HTN (doesn't remember his home meds) presented on the morning od admission with severe HA, neck pain, and palpitations. At 10:30am he had palpitations then at 11:30am while working, he developed nausea, dizziness, and neck pain. Noted to be hypertensive on arrival to the hospital (205/123).  CT head and CTA showed Hyperattenuation in the prepontine cistern, left ambient cistern, and left lateral portion of the suprasellar cistern compatible with subarachnoid hemorrhage. 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  CT angiography brain was negative. Recommended for further angiogram evaluation.     HOSP COURSE  Angio done: No vascular malformation.    Overnight events: Complained of chest pain.    REVIEW OF SYSTEMS: Negative except as below.       ICU Vital Signs Last 24 Hrs  T(C): 36.6 (15 Kg 2021 03:00), Max: 36.9 (14 Jun 2021 15:00)  T(F): 97.9 (15 Kg 2021 03:00), Max: 98.5 (14 Jun 2021 15:00)  HR: 65 (15 Kg 2021 06:00) (63 - 93)  BP: 151/101 (15 Kg 2021 06:00) (118/75 - 151/101)  BP(mean): 118 (15 Kg 2021 06:00) (89 - 118)  RR: 12 (15 Kg 2021 06:00) (12 - 25)  SpO2: 97% (15 Kg 2021 06:00) (90% - 98%)      06-14-21 @ 07:01  -  06-15-21 @ 07:00  --------------------------------------------------------  IN: 3550 mL / OUT: 2825 mL / NET: 725 mL    acetaminophen   Tablet .. 650 milliGRAM(s) Oral every 6 hours PRN  chlorhexidine 4% Liquid 1 Application(s) Topical <User Schedule>  enoxaparin Injectable 40 milliGRAM(s) SubCutaneous <User Schedule>  levETIRAcetam 500 milliGRAM(s) Oral two times a day  multiple electrolytes Injection Type 1 1000 milliLiter(s) (75 mL/Hr) IV Continuous <Continuous>  niMODipine 60 milliGRAM(s) Oral every 4 hours  polyethylene glycol 3350 17 Gram(s) Oral two times a day  senna 2 Tablet(s) Oral at bedtime                          15.3   8.78  )-----------( 184      ( 14 Jun 2021 22:17 )             45.3     06-14    136  |  102  |  26<H>  ----------------------------<  105<H>  3.9   |  20<L>  |  0.95    Ca    9.0      14 Jun 2021 22:17  Phos  3.3     06-14  Mg     2.0     06-14        PHYSICAL EXAM:  General: No Acute Distress   Neurological: Awake, alert oriented to person, place and time, Following Commands, right pupil 3 mm, left pupil 2 mm, EOMI, Face Symmetrical, Speech Fluent, Moving all extremities, Muscle Strength normal in all four extremities, No Drift, Sensation to Light Touch Intact  Pulmonary: Clear to Auscultation, No Rales, No Rhonchi, No Wheezes   Cardiovascular: S1, S2, Regular Rate and Rhythm   Gastrointestinal: Soft, Nontender, Nondistended   Extremities: No calf tenderness     65 year old male with PMHx of HTN (doesn't remember his home meds) presented on the morning od admission with severe HA, neck pain, and palpitations. At 10:30am he had palpitations then at 11:30am while working, he developed nausea, dizziness, and neck pain. Noted to be hypertensive on arrival to the hospital (205/123).  CT head and CTA showed Hyperattenuation in the prepontine cistern, left ambient cistern, and left lateral portion of the suprasellar cistern compatible with subarachnoid hemorrhage. 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  CT angiography brain was negative. Recommended for further angiogram evaluation.     HOSP COURSE  Angio done: No vascular malformation.    Overnight events: Complained of chest pain.    REVIEW OF SYSTEMS: [] Unable to Assess due to neurologic exam   [ x] All ROS addressed below are non-contributory, except:  Neuro: [ x] Headache [ ] Back pain [ ] Numbness [ ] Weakness [ ] Ataxia [ ] Dizziness [ ] Aphasia [ ] Dysarthria [ ] Visual disturbance  Resp: [ ] Shortness of breath/dyspnea [ ] Orthopnea [ ] Cough  CV: [ ] Chest pain [ ] Palpitation [ ] Lightheadedness [ ] Syncope  Renal: [ ] Thirst [ ] Edema  GI: [ ] Nausea [ ] Emesis [ ] Abdominal pain [ ] Constipation [ ] Diarrhea  Hem: [ ] Hematemesis [ ] bBright red blood per rectum  ID: [ ] Fever [ ] Chills [ ] Dysuria  ENT: [ ] Rhinorrhea      ICU Vital Signs Last 24 Hrs  T(C): 36.6 (15 Kg 2021 03:00), Max: 36.9 (14 Jun 2021 15:00)  T(F): 97.9 (15 Kg 2021 03:00), Max: 98.5 (14 Jun 2021 15:00)  HR: 65 (15 Kg 2021 06:00) (63 - 93)  BP: 151/101 (15 Kg 2021 06:00) (118/75 - 151/101)  BP(mean): 118 (15 Kg 2021 06:00) (89 - 118)  RR: 12 (15 Kg 2021 06:00) (12 - 25)  SpO2: 97% (15 Kg 2021 06:00) (90% - 98%)      06-14-21 @ 07:01  -  06-15-21 @ 07:00  --------------------------------------------------------  IN: 3550 mL / OUT: 2825 mL / NET: 725 mL    acetaminophen   Tablet .. 650 milliGRAM(s) Oral every 6 hours PRN  chlorhexidine 4% Liquid 1 Application(s) Topical <User Schedule>  enoxaparin Injectable 40 milliGRAM(s) SubCutaneous <User Schedule>  levETIRAcetam 500 milliGRAM(s) Oral two times a day  multiple electrolytes Injection Type 1 1000 milliLiter(s) (75 mL/Hr) IV Continuous <Continuous>  niMODipine 60 milliGRAM(s) Oral every 4 hours  polyethylene glycol 3350 17 Gram(s) Oral two times a day  senna 2 Tablet(s) Oral at bedtime                          15.3   8.78  )-----------( 184      ( 14 Jun 2021 22:17 )             45.3     06-14    136  |  102  |  26<H>  ----------------------------<  105<H>  3.9   |  20<L>  |  0.95    Ca    9.0      14 Jun 2021 22:17  Phos  3.3     06-14  Mg     2.0     06-14        PHYSICAL EXAM:  General: No Acute Distress   Neurological: Awake, alert oriented to person, place and time, Following Commands, right pupil 3 mm, left pupil 2 mm, EOMI, Face Symmetrical, Speech Fluent, Moving all extremities, Muscle Strength normal in all four extremities, No Drift, Sensation to Light Touch Intact  Pulmonary: Clear to Auscultation, No Rales, No Rhonchi, No Wheezes   Cardiovascular: S1, S2, Regular Rate and Rhythm   Gastrointestinal: Soft, Nontender, Nondistended   Extremities: No calf tenderness

## 2021-06-15 NOTE — PROVIDER CONTACT NOTE (OTHER) - SITUATION
Patient complaining of new onset "chest beating fast"
10PM Nimodipine dose held, pt. not on unit for administration at this time, pt. in angio

## 2021-06-15 NOTE — PROVIDER CONTACT NOTE (OTHER) - RECOMMENDATIONS
Monitor blood pressure when patient returns from IR
Continue to monitor patient for additional signs of hemodynamic instability

## 2021-06-15 NOTE — PROGRESS NOTE ADULT - SUBJECTIVE AND OBJECTIVE BOX
Patient seen and examined at bedside.    --Anticoagulation--  enoxaparin Injectable 40 milliGRAM(s) SubCutaneous <User Schedule>    T(C): 36.6 (06-15-21 @ 03:00), Max: 36.9 (06-14-21 @ 15:00)  HR: 63 (06-15-21 @ 04:00) (63 - 93)  BP: 122/92 (06-15-21 @ 04:00) (114/75 - 151/98)  RR: 16 (06-15-21 @ 04:00) (14 - 25)  SpO2: 94% (06-15-21 @ 04:00) (90% - 98%)  Wt(kg): --    Exam: awake, oriented, KAHN 5/5, no drift

## 2021-06-15 NOTE — PROVIDER CONTACT NOTE (OTHER) - ASSESSMENT
Pt. complaining of chest beating fast, declines chest pain, no shortness of breath. Patient in normal sinus rhythm with 141/92 blood pressure and . Pt. A&O x4, moves all extremities. No neurological deficits. See assessment flowsheet for additional findings

## 2021-06-15 NOTE — PROGRESS NOTE ADULT - ASSESSMENT
A/P: SAH likely non- aneurysmal CTA is neg.  PBD4  Angio negative   mFS 2, HHS 2  NIHSS 0   Still suspicious for underlying aneurysm; will repeat angio on 6/18    Neuro:   DSA neg for vascular malformation   Neurochecks Q2 hr  MRI cervical spine and brain no vascular malformation   Nimodipine, keppra 500 mg BID for seizure prophylaxis   TCDs  Target euvolemia  Repeat angio 6/18  Ambulating     Respiratory:   RA    CV: Chest pain  EKG, trops  TTE  -160 mmhg     Endocrine:   Keep sugar 120-180 mmhg     Heme/Onc: INR <1.5, Plt>100             DVT ppx: SCD, lovenox    Renal:   Plasmalyte 75 ml/hr     ID:   Afebrile    GI:   Regular diet  Protonix, NG, colace   LBM:     Social/Family:   Updated at bedside    Discharge planning: ICU    Code Status: [x] Full Code [] DNR [] DNI [] Goals of Care:     Disposition: [x] ICU [] Stroke Unit [] RCU []PCU []Floor [] Discharge Home     Patient at high risk for neurologic deterioration, aneurysm rerupture, seizures, ICP crisis, critical care time, excluding procedures: 35 minutes               A/P: SAH likely non- aneurysmal CTA is neg.  PBD4  Angio negative   mFS 2, HHS 2  NIHSS 0   Still suspicious for underlying aneurysm; will repeat angio on 6/18    Neuro:   DSA neg for vascular malformation   Neurochecks Q2 hr  MRI cervical spine and brain no vascular malformation   Nimodipine, keppra 500 mg BID for seizure prophylaxis   TCDs  Target euvolemia  Repeat angio 6/18  Ambulating     Respiratory:   RA    CV: Chest pain  EKG, trops  TTE  -160 mmhg     Endocrine:   Keep sugar 120-180 mmhg     Heme/Onc: INR <1.5, Plt>100             DVT ppx: SCD, lovenox    Renal:   Plasmalyte 75 ml/hr     ID:   Afebrile    GI:   Regular diet  Protonix, NG, colace   LBM:     Social/Family:   Updated at bedside    Discharge planning: ICU    Code Status: [x] Full Code [] DNR [] DNI [] Goals of Care:     Disposition: [x] ICU [] Stroke Unit [] RCU []PCU []Floor [] Discharge Home     Patient at high risk for neurologic deterioration, aneurysm rerupture, seizures

## 2021-06-15 NOTE — DIETITIAN INITIAL EVALUATION ADULT. - ORAL INTAKE PTA/DIET HISTORY
Pt reports good appetite (in-house and PTA). Consumes on average three meals daily; denies food allergies and intolerance to chewing/swallowing. Generally enjoys most foods; no specific dietary preferences noted. Utilizing daily menus. Denies N/V/C/diarrhea at baseline. Takes daily vitamin B12, vitamin C, and Luetin. Pt made aware of RD available daily if questions/concerns arise. None noted at this time.

## 2021-06-15 NOTE — PROVIDER CONTACT NOTE (OTHER) - ACTION/TREATMENT ORDERED:
Ok to hold nimodipine, administer next dose as ordered, monitor blood pressure
EKG done, troponin level drawn as per HALEY Trujillo.

## 2021-06-15 NOTE — DIETITIAN INITIAL EVALUATION ADULT. - ADD RECOMMEND
1. Recommend continue PO diet with no therapeutic restrictions. Defer consistency to medical team, SLP. 2. Monitor and encourage PO intake. Encourage use of daily menus. Honor dietary preferences as expressed as able. 3. Consider oral nutrition supplement if suboptimal/changes in PO intake noted. 4. Monitor wt trends, nutrition related labs, skin integrity, hydration status and bowel regularity.

## 2021-06-15 NOTE — DIETITIAN INITIAL EVALUATION ADULT. - CHIEF COMPLAINT
Pt is a 66 yo M with PMH: HTN. Presented with severe HA, neck pain and palpitations. Noted to be hypertensive on arrival to the hospital. CT head and CTA showed hyperattenuation in the prepontine cistern, left ambient cistern, and left lateral portion of the suprasellar cistern compatible with the subarachnoid hemorrhage. CT angiography of the brain was negative. 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 CT angiography of brain was negative. Pending repeat angio; possibly 6/18 (per MD note 6/15). Pt is a 64 yo M with PMH: HTN. Presented with severe HA, neck pain and palpitations. Noted to be hypertensive on arrival to the hospital. CT head and CTA showed hyperattenuation in the prepontine cistern, left ambient cistern, and left lateral portion of the suprasellar cistern compatible with the subarachnoid hemorrhage. CT angiography of the brain was negative. 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 Pending repeat angio; possibly 6/18 (per MD note 6/15).

## 2021-06-16 LAB
ANION GAP SERPL CALC-SCNC: 16 MMOL/L — SIGNIFICANT CHANGE UP (ref 5–17)
ANION GAP SERPL CALC-SCNC: 16 MMOL/L — SIGNIFICANT CHANGE UP (ref 5–17)
BUN SERPL-MCNC: 18 MG/DL — SIGNIFICANT CHANGE UP (ref 7–23)
BUN SERPL-MCNC: 20 MG/DL — SIGNIFICANT CHANGE UP (ref 7–23)
CALCIUM SERPL-MCNC: 9.3 MG/DL — SIGNIFICANT CHANGE UP (ref 8.4–10.5)
CALCIUM SERPL-MCNC: 9.6 MG/DL — SIGNIFICANT CHANGE UP (ref 8.4–10.5)
CHLORIDE SERPL-SCNC: 100 MMOL/L — SIGNIFICANT CHANGE UP (ref 96–108)
CHLORIDE SERPL-SCNC: 101 MMOL/L — SIGNIFICANT CHANGE UP (ref 96–108)
CO2 SERPL-SCNC: 19 MMOL/L — LOW (ref 22–31)
CO2 SERPL-SCNC: 20 MMOL/L — LOW (ref 22–31)
CREAT SERPL-MCNC: 0.79 MG/DL — SIGNIFICANT CHANGE UP (ref 0.5–1.3)
CREAT SERPL-MCNC: 0.96 MG/DL — SIGNIFICANT CHANGE UP (ref 0.5–1.3)
GLUCOSE SERPL-MCNC: 106 MG/DL — HIGH (ref 70–99)
GLUCOSE SERPL-MCNC: 109 MG/DL — HIGH (ref 70–99)
LMWH PPP CHRO-ACNC: 0.35 IU/ML — LOW (ref 0.5–1.1)
MAGNESIUM SERPL-MCNC: 2 MG/DL — SIGNIFICANT CHANGE UP (ref 1.6–2.6)
MAGNESIUM SERPL-MCNC: 2.1 MG/DL — SIGNIFICANT CHANGE UP (ref 1.6–2.6)
PHOSPHATE SERPL-MCNC: 3.8 MG/DL — SIGNIFICANT CHANGE UP (ref 2.5–4.5)
PHOSPHATE SERPL-MCNC: 3.9 MG/DL — SIGNIFICANT CHANGE UP (ref 2.5–4.5)
POTASSIUM SERPL-MCNC: 3.6 MMOL/L — SIGNIFICANT CHANGE UP (ref 3.5–5.3)
POTASSIUM SERPL-MCNC: 3.9 MMOL/L — SIGNIFICANT CHANGE UP (ref 3.5–5.3)
POTASSIUM SERPL-SCNC: 3.6 MMOL/L — SIGNIFICANT CHANGE UP (ref 3.5–5.3)
POTASSIUM SERPL-SCNC: 3.9 MMOL/L — SIGNIFICANT CHANGE UP (ref 3.5–5.3)
SODIUM SERPL-SCNC: 136 MMOL/L — SIGNIFICANT CHANGE UP (ref 135–145)
SODIUM SERPL-SCNC: 136 MMOL/L — SIGNIFICANT CHANGE UP (ref 135–145)

## 2021-06-16 PROCEDURE — 99291 CRITICAL CARE FIRST HOUR: CPT

## 2021-06-16 RX ORDER — LOSARTAN POTASSIUM 100 MG/1
50 TABLET, FILM COATED ORAL DAILY
Refills: 0 | Status: DISCONTINUED | OUTPATIENT
Start: 2021-06-16 | End: 2021-06-18

## 2021-06-16 RX ORDER — POTASSIUM CHLORIDE 20 MEQ
20 PACKET (EA) ORAL ONCE
Refills: 0 | Status: COMPLETED | OUTPATIENT
Start: 2021-06-16 | End: 2021-06-16

## 2021-06-16 RX ORDER — POTASSIUM CHLORIDE 20 MEQ
40 PACKET (EA) ORAL ONCE
Refills: 0 | Status: COMPLETED | OUTPATIENT
Start: 2021-06-16 | End: 2021-06-16

## 2021-06-16 RX ADMIN — NIMODIPINE 60 MILLIGRAM(S): 60 SOLUTION ORAL at 14:21

## 2021-06-16 RX ADMIN — NIMODIPINE 60 MILLIGRAM(S): 60 SOLUTION ORAL at 17:20

## 2021-06-16 RX ADMIN — NIMODIPINE 60 MILLIGRAM(S): 60 SOLUTION ORAL at 06:00

## 2021-06-16 RX ADMIN — Medication 40 MILLIEQUIVALENT(S): at 00:14

## 2021-06-16 RX ADMIN — CHLORHEXIDINE GLUCONATE 1 APPLICATION(S): 213 SOLUTION TOPICAL at 09:26

## 2021-06-16 RX ADMIN — NIMODIPINE 60 MILLIGRAM(S): 60 SOLUTION ORAL at 21:14

## 2021-06-16 RX ADMIN — NIMODIPINE 60 MILLIGRAM(S): 60 SOLUTION ORAL at 09:25

## 2021-06-16 RX ADMIN — NIMODIPINE 60 MILLIGRAM(S): 60 SOLUTION ORAL at 01:59

## 2021-06-16 RX ADMIN — LOSARTAN POTASSIUM 50 MILLIGRAM(S): 100 TABLET, FILM COATED ORAL at 15:55

## 2021-06-16 RX ADMIN — Medication 20 MILLIEQUIVALENT(S): at 23:05

## 2021-06-16 RX ADMIN — ENOXAPARIN SODIUM 40 MILLIGRAM(S): 100 INJECTION SUBCUTANEOUS at 17:20

## 2021-06-16 NOTE — PROGRESS NOTE ADULT - ASSESSMENT
A/P: SAH likely non- aneurysmal CTA is neg.  PBD 5  Angio negative   mFS 2, HHS 2  NIHSS 0   Still suspicious for underlying aneurysm; will repeat angio on 6/18    Neuro:   DSA neg for vascular malformation   Neurochecks Q2 hr  MRI cervical spine and brain no vascular malformation   Nimodipine, keppra 500 mg BID for seizure prophylaxis   TCDs  Target euvolemia  Repeat angio 6/18  Ambulating     Respiratory:   RA    CV: Chest pain  EKG, trops  TTE  -160 mmhg     Endocrine:   Keep sugar 120-180 mmhg     Heme/Onc: INR <1.5, Plt>100             DVT ppx: SCD, lovenox    Renal:   Plasmalyte 75 ml/hr     ID:   Afebrile    GI:   Regular diet  Protonix, NG, colace   LBM:     Social/Family:   Updated at bedside    Discharge planning: ICU    Code Status: [x] Full Code [] DNR [] DNI [] Goals of Care:     Disposition: [x] ICU [] Stroke Unit [] RCU []PCU []Floor [] Discharge Home     Patient at high risk for neurologic deterioration, aneurysm rerupture, seizures               A/P: SAH likely non- aneurysmal CTA is neg.  PBD 5  Angio negative   mFS 2, HHS 2  NIHSS 0   Still suspicious for underlying aneurysm; will repeat angio on 6/18    Neuro:   DSA neg for vascular malformation   Neurochecks Q2 hr  MRI cervical spine and brain no vascular malformation   Nimodipine, keppra 500 mg BID for seizure prophylaxis   TCDs  Target euvolemia  Repeat angio 6/18  Ambulating     Respiratory:   RA    CV: Chest pain  EKG, trops  TTE  -160 mmhg     Endocrine:   Keep sugar 120-180 mmhg     Heme/Onc: INR <1.5, Plt>100             DVT ppx: SCD, lovenox    Renal:   Plasmalyte 75 ml/hr     ID:   Afebrile    GI:   Regular diet  Protonix, NG, colace    LBM:     Social/Family:   Updated at bedside    Discharge planning: ICU    Code Status: [x] Full Code [] DNR [] DNI [] Goals of Care:     Disposition: [x] ICU [] Stroke Unit [] RCU []PCU []Floor [] Discharge Home     Patient at high risk for neurologic deterioration, aneurysm rerupture, seizures               A/P: SAH likely non- aneurysmal CTA is neg. PBD 5  Angio negative   mFS 2, HHS 2  NIHSS 0     Neuro:   DSA neg for vascular malformation   Still suspicious for possible underlying aneurysm; will repeat angio on 6/18  Neurochecks Q2 hr  MRI cervical spine and brain no vascular malformation   Nimodipine, keppra 500 mg BID for seizure prophylaxis   TCDs. Currently wnl  Target euvolemia  Ambulating     Respiratory:   RA    CV: Chest pain  EKG, trops negative  TTE normal EF  -160 mmhg     Endocrine:   Keep sugar 120-180 mmhg     Heme/Onc: INR <1.5, Plt>100             DVT ppx: SCD, lovenox    Renal:   IVL    ID:   Afebrile    GI:   Regular diet  Protonix, NG, colace    LBM: 6/15    Social/Family:   Updated at bedside    Discharge planning: ICU    Code Status: [x] Full Code [] DNR [] DNI [] Goals of Care:     Disposition: [x] ICU [] Stroke Unit [] RCU []PCU []Floor [] Discharge Home     Patient at high risk for neurologic deterioration, aneurysm rerupture, seizures

## 2021-06-16 NOTE — PROGRESS NOTE ADULT - SUBJECTIVE AND OBJECTIVE BOX
NSCU ATTENDING -- ADDITIONAL PROGRESS NOTE    Nighttime rounds were performed -- please refer to earlier Progress Note for HPI details.    T(C): 36.5 (06-17-21 @ 03:00), Max: 36.8 (06-16-21 @ 16:00)  HR: 68 (06-17-21 @ 04:00) (68 - 117)  BP: 107/66 (06-17-21 @ 04:00) (107/66 - 167/113)  RR: 15 (06-17-21 @ 04:00) (12 - 26)  SpO2: 91% (06-17-21 @ 04:00) (91% - 96%)  Wt(kg): --    Relevant labwork and imaging reviewed.    Intact.  No distress.  Repeat angio 6/18.

## 2021-06-16 NOTE — PROGRESS NOTE ADULT - SUBJECTIVE AND OBJECTIVE BOX
Patient seen and examined at bedside.    --Anticoagulation--  enoxaparin Injectable 40 milliGRAM(s) SubCutaneous <User Schedule>    T(C): 36.4 (06-16-21 @ 07:00), Max: 37.2 (06-15-21 @ 15:00)  HR: 96 (06-16-21 @ 08:00) (64 - 107)  BP: 138/89 (06-16-21 @ 08:00) (126/92 - 176/100)  RR: 25 (06-16-21 @ 08:00) (15 - 25)  SpO2: 95% (06-16-21 @ 08:00) (90% - 98%)  Wt(kg): --    Exam: AAOx3, FC, KAHN 5/5, no drift

## 2021-06-16 NOTE — PROGRESS NOTE ADULT - SUBJECTIVE AND OBJECTIVE BOX
65 year old male with PMHx of HTN (doesn't remember his home meds) presented on the morning od admission with severe HA, neck pain, and palpitations. At 10:30am he had palpitations then at 11:30am while working, he developed nausea, dizziness, and neck pain. Noted to be hypertensive on arrival to the hospital (205/123).  CT head and CTA showed Hyperattenuation in the prepontine cistern, left ambient cistern, and left lateral portion of the suprasellar cistern compatible with subarachnoid hemorrhage. 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  CT angiography brain was negative. Recommended for further angiogram evaluation.     HOSP COURSE  Angio done: No vascular malformation.    Overnight events:     REVIEW OF SYSTEMS: [] Unable to Assess due to neurologic exam   [ x] All ROS addressed below are non-contributory, except:  Neuro: [ x] Headache [ ] Back pain [ ] Numbness [ ] Weakness [ ] Ataxia [ ] Dizziness [ ] Aphasia [ ] Dysarthria [ ] Visual disturbance  Resp: [ ] Shortness of breath/dyspnea [ ] Orthopnea [ ] Cough  CV: [ ] Chest pain [ ] Palpitation [ ] Lightheadedness [ ] Syncope  Renal: [ ] Thirst [ ] Edema  GI: [ ] Nausea [ ] Emesis [ ] Abdominal pain [ ] Constipation [ ] Diarrhea  Hem: [ ] Hematemesis [ ] bBright red blood per rectum  ID: [ ] Fever [ ] Chills [ ] Dysuria  ENT: [ ] Rhinorrhea      ICU Vital Signs Last 24 Hrs  T(C): 36.8 (16 Jun 2021 03:00), Max: 37.2 (15 Kg 2021 15:00)  T(F): 98.2 (16 Jun 2021 03:00), Max: 98.9 (15 Kg 2021 15:00)  HR: 75 (16 Jun 2021 06:00) (64 - 107)  BP: 152/97 (16 Jun 2021 06:00) (126/92 - 176/100)  BP(mean): 119 (16 Jun 2021 06:00) (101 - 129)  RR: 16 (16 Jun 2021 06:00) (15 - 20)  SpO2: 94% (16 Jun 2021 06:00) (90% - 98%)      06-15-21 @ 07:01  -  06-16-21 @ 07:00  --------------------------------------------------------  IN: 1240 mL / OUT: 400 mL / NET: 840 mL      acetaminophen   Tablet .. 650 milliGRAM(s) Oral every 6 hours PRN  chlorhexidine 4% Liquid 1 Application(s) Topical <User Schedule>  enoxaparin Injectable 40 milliGRAM(s) SubCutaneous <User Schedule>  niMODipine 60 milliGRAM(s) Oral every 4 hours  polyethylene glycol 3350 17 Gram(s) Oral two times a day  senna 2 Tablet(s) Oral at bedtime                          17.0   7.01  )-----------( 199      ( 15 Kg 2021 23:17 )             49.0     06-15    136  |  100  |  18  ----------------------------<  106<H>  3.6   |  20<L>  |  0.79    Ca    9.3      15 Kg 2021 23:17  Phos  3.9     06-15  Mg     2.0     06-15        PHYSICAL EXAM:  General: No Acute Distress   Neurological: Awake, alert oriented to person, place and time, Following Commands, right pupil 3 mm, left pupil 2 mm, EOMI, Face Symmetrical, Speech Fluent, Moving all extremities, Muscle Strength normal in all four extremities, No Drift, Sensation to Light Touch Intact  Pulmonary: Clear to Auscultation, No Rales, No Rhonchi, No Wheezes   Cardiovascular: S1, S2, Regular Rate and Rhythm   Gastrointestinal: Soft, Nontender, Nondistended   Extremities: No calf tenderness     65 year old male with PMHx of HTN (doesn't remember his home meds) presented on the morning od admission with severe HA, neck pain, and palpitations. At 10:30am he had palpitations then at 11:30am while working, he developed nausea, dizziness, and neck pain. Noted to be hypertensive on arrival to the hospital (205/123).  CT head and CTA showed Hyperattenuation in the prepontine cistern, left ambient cistern, and left lateral portion of the suprasellar cistern compatible with subarachnoid hemorrhage. 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  CT angiography brain was negative. Recommended for further angiogram evaluation.     HOSP COURSE  Angio done: No vascular malformation.    Overnight events:   Afebrile    REVIEW OF SYSTEMS: [] Unable to Assess due to neurologic exam   [ x] All ROS addressed below are non-contributory, except:  Neuro: [ x] Headache [ ] Back pain [ ] Numbness [ ] Weakness [ ] Ataxia [ ] Dizziness [ ] Aphasia [ ] Dysarthria [ ] Visual disturbance  Resp: [ ] Shortness of breath/dyspnea [ ] Orthopnea [ ] Cough  CV: [ ] Chest pain [ ] Palpitation [ ] Lightheadedness [ ] Syncope  Renal: [ ] Thirst [ ] Edema  GI: [ ] Nausea [ ] Emesis [ ] Abdominal pain [ ] Constipation [ ] Diarrhea  Hem: [ ] Hematemesis [ ] Bright red blood per rectum  ID: [ ] Fever [ ] Chills [ ] Dysuria  ENT: [ ] Rhinorrhea      ICU Vital Signs Last 24 Hrs  T(C): 36.8 (16 Jun 2021 03:00), Max: 37.2 (15 Kg 2021 15:00)  T(F): 98.2 (16 Jun 2021 03:00), Max: 98.9 (15 Kg 2021 15:00)  HR: 75 (16 Jun 2021 06:00) (64 - 107)  BP: 152/97 (16 Jun 2021 06:00) (126/92 - 176/100)  BP(mean): 119 (16 Jun 2021 06:00) (101 - 129)  RR: 16 (16 Jun 2021 06:00) (15 - 20)  SpO2: 94% (16 Jun 2021 06:00) (90% - 98%)      06-15-21 @ 07:01  -  06-16-21 @ 07:00  --------------------------------------------------------  IN: 1240 mL / OUT: 400 mL / NET: 840 mL      acetaminophen   Tablet .. 650 milliGRAM(s) Oral every 6 hours PRN  chlorhexidine 4% Liquid 1 Application(s) Topical <User Schedule>  enoxaparin Injectable 40 milliGRAM(s) SubCutaneous <User Schedule>  niMODipine 60 milliGRAM(s) Oral every 4 hours  polyethylene glycol 3350 17 Gram(s) Oral two times a day  senna 2 Tablet(s) Oral at bedtime                          17.0   7.01  )-----------( 199      ( 15 Kg 2021 23:17 )             49.0     06-15    136  |  100  |  18  ----------------------------<  106<H>  3.6   |  20<L>  |  0.79    Ca    9.3      15 Kg 2021 23:17  Phos  3.9     06-15  Mg     2.0     06-15      PHYSICAL EXAM:  General: No Acute Distress   Neurological: Awake, alert oriented to person, place and time, Following Commands, PERRLA, EOMI, Face Symmetrical, Speech Fluent, Moving all extremities, Muscle Strength normal in all four extremities, No Drift, Sensation to Light Touch Intact  Pulmonary: Clear to Auscultation, No Rales, No Rhonchi, No Wheezes   Cardiovascular: S1, S2, Regular Rate and Rhythm   Gastrointestinal: Soft, Nontender, Nondistended   Extremities: No calf tenderness

## 2021-06-17 LAB
ANION GAP SERPL CALC-SCNC: 11 MMOL/L — SIGNIFICANT CHANGE UP (ref 5–17)
APTT BLD: 36.1 SEC — HIGH (ref 27.5–35.5)
BLD GP AB SCN SERPL QL: NEGATIVE — SIGNIFICANT CHANGE UP
BUN SERPL-MCNC: 25 MG/DL — HIGH (ref 7–23)
CALCIUM SERPL-MCNC: 9.2 MG/DL — SIGNIFICANT CHANGE UP (ref 8.4–10.5)
CHLORIDE SERPL-SCNC: 101 MMOL/L — SIGNIFICANT CHANGE UP (ref 96–108)
CO2 SERPL-SCNC: 21 MMOL/L — LOW (ref 22–31)
CREAT SERPL-MCNC: 1.2 MG/DL — SIGNIFICANT CHANGE UP (ref 0.5–1.3)
GLUCOSE SERPL-MCNC: 112 MG/DL — HIGH (ref 70–99)
HCT VFR BLD CALC: 51 % — HIGH (ref 39–50)
HGB BLD-MCNC: 17.2 G/DL — HIGH (ref 13–17)
INR BLD: 1.04 RATIO — SIGNIFICANT CHANGE UP (ref 0.88–1.16)
MAGNESIUM SERPL-MCNC: 2.1 MG/DL — SIGNIFICANT CHANGE UP (ref 1.6–2.6)
MCHC RBC-ENTMCNC: 31.4 PG — SIGNIFICANT CHANGE UP (ref 27–34)
MCHC RBC-ENTMCNC: 33.7 GM/DL — SIGNIFICANT CHANGE UP (ref 32–36)
MCV RBC AUTO: 93.2 FL — SIGNIFICANT CHANGE UP (ref 80–100)
NRBC # BLD: 0 /100 WBCS — SIGNIFICANT CHANGE UP (ref 0–0)
PHOSPHATE SERPL-MCNC: 3.1 MG/DL — SIGNIFICANT CHANGE UP (ref 2.5–4.5)
PLATELET # BLD AUTO: 207 K/UL — SIGNIFICANT CHANGE UP (ref 150–400)
POTASSIUM SERPL-MCNC: 4.4 MMOL/L — SIGNIFICANT CHANGE UP (ref 3.5–5.3)
POTASSIUM SERPL-SCNC: 4.4 MMOL/L — SIGNIFICANT CHANGE UP (ref 3.5–5.3)
PROTHROM AB SERPL-ACNC: 12.5 SEC — SIGNIFICANT CHANGE UP (ref 10.6–13.6)
RBC # BLD: 5.47 M/UL — SIGNIFICANT CHANGE UP (ref 4.2–5.8)
RBC # FLD: 12.1 % — SIGNIFICANT CHANGE UP (ref 10.3–14.5)
RH IG SCN BLD-IMP: POSITIVE — SIGNIFICANT CHANGE UP
SODIUM SERPL-SCNC: 133 MMOL/L — LOW (ref 135–145)
WBC # BLD: 6.91 K/UL — SIGNIFICANT CHANGE UP (ref 3.8–10.5)
WBC # FLD AUTO: 6.91 K/UL — SIGNIFICANT CHANGE UP (ref 3.8–10.5)

## 2021-06-17 PROCEDURE — 99291 CRITICAL CARE FIRST HOUR: CPT

## 2021-06-17 RX ORDER — ACETAMINOPHEN 500 MG
650 TABLET ORAL EVERY 6 HOURS
Refills: 0 | Status: DISCONTINUED | OUTPATIENT
Start: 2021-06-17 | End: 2021-06-18

## 2021-06-17 RX ORDER — SODIUM CHLORIDE 5 G/100ML
1000 INJECTION, SOLUTION INTRAVENOUS
Refills: 0 | Status: DISCONTINUED | OUTPATIENT
Start: 2021-06-17 | End: 2021-06-18

## 2021-06-17 RX ORDER — POTASSIUM CHLORIDE 20 MEQ
40 PACKET (EA) ORAL ONCE
Refills: 0 | Status: COMPLETED | OUTPATIENT
Start: 2021-06-17 | End: 2021-06-17

## 2021-06-17 RX ORDER — ACETAMINOPHEN 500 MG
1000 TABLET ORAL EVERY 6 HOURS
Refills: 0 | Status: DISCONTINUED | OUTPATIENT
Start: 2021-06-17 | End: 2021-06-17

## 2021-06-17 RX ADMIN — CHLORHEXIDINE GLUCONATE 1 APPLICATION(S): 213 SOLUTION TOPICAL at 09:39

## 2021-06-17 RX ADMIN — POLYETHYLENE GLYCOL 3350 17 GRAM(S): 17 POWDER, FOR SOLUTION ORAL at 17:10

## 2021-06-17 RX ADMIN — Medication 1000 MILLIGRAM(S): at 03:19

## 2021-06-17 RX ADMIN — NIMODIPINE 60 MILLIGRAM(S): 60 SOLUTION ORAL at 06:22

## 2021-06-17 RX ADMIN — NIMODIPINE 60 MILLIGRAM(S): 60 SOLUTION ORAL at 17:11

## 2021-06-17 RX ADMIN — NIMODIPINE 60 MILLIGRAM(S): 60 SOLUTION ORAL at 14:05

## 2021-06-17 RX ADMIN — Medication 650 MILLIGRAM(S): at 20:39

## 2021-06-17 RX ADMIN — Medication 1000 MILLIGRAM(S): at 03:49

## 2021-06-17 RX ADMIN — NIMODIPINE 60 MILLIGRAM(S): 60 SOLUTION ORAL at 21:04

## 2021-06-17 RX ADMIN — LOSARTAN POTASSIUM 50 MILLIGRAM(S): 100 TABLET, FILM COATED ORAL at 06:22

## 2021-06-17 RX ADMIN — ENOXAPARIN SODIUM 40 MILLIGRAM(S): 100 INJECTION SUBCUTANEOUS at 17:10

## 2021-06-17 RX ADMIN — NIMODIPINE 60 MILLIGRAM(S): 60 SOLUTION ORAL at 11:23

## 2021-06-17 RX ADMIN — Medication 40 MILLIEQUIVALENT(S): at 09:40

## 2021-06-17 RX ADMIN — Medication 650 MILLIGRAM(S): at 21:09

## 2021-06-17 RX ADMIN — NIMODIPINE 60 MILLIGRAM(S): 60 SOLUTION ORAL at 03:05

## 2021-06-17 RX ADMIN — CHLORHEXIDINE GLUCONATE 1 APPLICATION(S): 213 SOLUTION TOPICAL at 21:03

## 2021-06-17 NOTE — PROGRESS NOTE ADULT - ASSESSMENT
A/P: SAH likely non- aneurysmal CTA is neg. PBD 5  Angio negative   mFS 2, HHS 2  NIHSS 0     Neuro:   DSA neg for vascular malformation   Still suspicious for possible underlying aneurysm; will repeat angio on 6/18  Neurochecks Q2 hr  MRI cervical spine and brain no vascular malformation   Nimodipine, keppra 500 mg BID for seizure prophylaxis   TCDs. Currently wnl  Target euvolemia  Ambulating     Respiratory:   RA    CV: Chest pain  EKG, trops negative  TTE normal EF  -160 mmhg     Endocrine:   Keep sugar 120-180 mmhg     Heme/Onc: INR <1.5, Plt>100             DVT ppx: SCD, lovenox    Renal:   IVL    ID:   Afebrile    GI:   Regular diet  Protonix, NG, colace    LBM: 6/15    Social/Family:   Updated at bedside    Discharge planning: ICU    Code Status: [x] Full Code [] DNR [] DNI [] Goals of Care:     Disposition: [x] ICU [] Stroke Unit [] RCU []PCU []Floor [] Discharge Home     Patient at high risk for neurologic deterioration, aneurysm rerupture, seizures               A/P: SAH likely non- aneurysmal CTA is neg. PBD 5  Angio negative   mFS 2, HHS 2  NIHSS 0     Neuro:   DSA neg for vascular malformation   Still suspicious for possible underlying aneurysm; will repeat angio on 6/18.  Repeat angio pending 6/18  Neurochecks Q2 hr  MRI cervical spine and brain no vascular malformation   Nimodipine, keppra 500 mg BID for seizure prophylaxis   TCDs. Currently wnl  Target euvolemia  Ambulating     Respiratory:   RA    CV: Chest pain  EKG, trops negative  TTE normal EF  -160 mmhg   Losartan 50mg started    Endocrine:   Keep sugar 120-180 mmhg     Heme/Onc: INR <1.5, Plt>100             DVT ppx: SCD, lovenox    Renal:   IVL    ID:   Afebrile    GI:   Regular diet  Protonix, NG, colace    LBM: 6/15    Social/Family:   Updated at bedside    Discharge planning: ICU    Code Status: [x] Full Code [] DNR [] DNI [] Goals of Care:     Disposition: [x] ICU [] Stroke Unit [] RCU []PCU []Floor [] Discharge Home     Patient at high risk for neurologic deterioration, aneurysm rerupture, seizures               A/P: SAH likely non- aneurysmal CTA is neg. PBD 5  Angio negative   mFS 2, HHS 2  NIHSS 0     Neuro:   DSA neg for vascular malformation   Still suspicious for possible underlying aneurysm; will repeat angio on 6/18.  Repeat angio pending 6/18  Neurochecks Q2 hr  MRI cervical spine and brain no vascular malformation   Nimodipine, keppra 500 mg BID for seizure prophylaxis   TCDs. Currently wnl  Target euvolemia  Ambulating     Respiratory:   RA    CV: Chest pain  EKG, trops negative  TTE normal EF  -160 mmhg   Losartan 50mg started    Endocrine:   Keep sugar 120-180 mmhg     Heme/Onc: INR <1.5, Plt>100             DVT ppx: SCD, lovenox    Renal:   IVL    ID:   Afebrile    GI:   Regular diet  Protonix, NG, colace    LBM: 6/15    Social/Family:   Updated at bedside    Discharge planning: ICU    Code Status: [x] Full Code [] DNR [] DNI [] Goals of Care:     Disposition: [x] ICU [] Stroke Unit [] RCU []PCU []Floor [] Discharge Home      Patient at high risk for neurologic deterioration, aneurysm rerupture, seizures

## 2021-06-17 NOTE — PROGRESS NOTE ADULT - SUBJECTIVE AND OBJECTIVE BOX
Patient seen and examined at bedside.    --Anticoagulation--  enoxaparin Injectable 40 milliGRAM(s) SubCutaneous <User Schedule>    T(C): 36.5 (06-17-21 @ 03:00), Max: 36.8 (06-16-21 @ 16:00)  HR: 68 (06-17-21 @ 04:00) (68 - 117)  BP: 107/66 (06-17-21 @ 04:00) (107/66 - 167/113)  RR: 15 (06-17-21 @ 04:00) (12 - 26)  SpO2: 91% (06-17-21 @ 04:00) (91% - 96%)  Wt(kg): --    Exam: AAOx3, FC, KAHN 5/5, no drift

## 2021-06-17 NOTE — PROGRESS NOTE ADULT - SUBJECTIVE AND OBJECTIVE BOX
SUMMARY:   65 year-old man with HTN admitted for HH2 mF2 subarachnoid hemorrhage on 6/12 with angio negative for vascular malformation.     24 HOUR EVENTS:   Planned for angio in AM.    VITALS/DATA/ORDERS: [x] Reviewed    EXAMINATION:   Awake, alert, fully oriented, follows, no drift, full strength

## 2021-06-17 NOTE — PROGRESS NOTE ADULT - SUBJECTIVE AND OBJECTIVE BOX
65 year old male with PMHx of HTN (doesn't remember his home meds) presented on the morning od admission with severe HA, neck pain, and palpitations. At 10:30am he had palpitations then at 11:30am while working, he developed nausea, dizziness, and neck pain. Noted to be hypertensive on arrival to the hospital (205/123).  CT head and CTA showed Hyperattenuation in the prepontine cistern, left ambient cistern, and left lateral portion of the suprasellar cistern compatible with subarachnoid hemorrhage. 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  CT angiography brain was negative. Recommended for further angiogram evaluation.     HOSP COURSE  Angio done: No vascular malformation.    Overnight events:   Afebrile    REVIEW OF SYSTEMS: [] Unable to Assess due to neurologic exam   [ x] All ROS addressed below are non-contributory, except:  Neuro: [ x] Headache [ ] Back pain [ ] Numbness [ ] Weakness [ ] Ataxia [ ] Dizziness [ ] Aphasia [ ] Dysarthria [ ] Visual disturbance  Resp: [ ] Shortness of breath/dyspnea [ ] Orthopnea [ ] Cough  CV: [ ] Chest pain [ ] Palpitation [ ] Lightheadedness [ ] Syncope  Renal: [ ] Thirst [ ] Edema  GI: [ ] Nausea [ ] Emesis [ ] Abdominal pain [ ] Constipation [ ] Diarrhea  Hem: [ ] Hematemesis [ ] Bright red blood per rectum  ID: [ ] Fever [ ] Chills [ ] Dysuria  ENT: [ ] Rhinorrhea      ICU Vital Signs Last 24 Hrs  T(C): 36.5 (17 Jun 2021 03:00), Max: 36.8 (16 Jun 2021 16:00)  T(F): 97.7 (17 Jun 2021 03:00), Max: 98.2 (16 Jun 2021 16:00)  HR: 80 (17 Jun 2021 07:00) (67 - 117)  BP: 110/64 (17 Jun 2021 07:00) (107/66 - 167/113)  BP(mean): 78 (17 Jun 2021 07:00) (78 - 133)  RR: 19 (17 Jun 2021 07:00) (12 - 26)  SpO2: 94% (17 Jun 2021 07:00) (90% - 96%)      06-16-21 @ 07:01  -  06-17-21 @ 07:00  --------------------------------------------------------  IN: 1440 mL / OUT: 750 mL / NET: 690 mL    acetaminophen   Tablet .. 1000 milliGRAM(s) Oral every 6 hours PRN  chlorhexidine 4% Liquid 1 Application(s) Topical <User Schedule>  enoxaparin Injectable 40 milliGRAM(s) SubCutaneous <User Schedule>  losartan 50 milliGRAM(s) Oral daily  niMODipine 60 milliGRAM(s) Oral every 4 hours  polyethylene glycol 3350 17 Gram(s) Oral two times a day  senna 2 Tablet(s) Oral at bedtime                          17.0   7.01  )-----------( 199      ( 15 Kg 2021 23:17 )             49.0     06-16    136  |  101  |  20  ----------------------------<  109<H>  3.9   |  19<L>  |  0.96    Ca    9.6      16 Jun 2021 21:38  Phos  3.8     06-16  Mg     2.1     06-16      PHYSICAL EXAM:  General: No Acute Distress   Neurological: Awake, alert oriented to person, place and time, Following Commands, PERRLA, EOMI, Face Symmetrical, Speech Fluent, Moving all extremities, Muscle Strength normal in all four extremities, No Drift, Sensation to Light Touch Intact  Pulmonary: Clear to Auscultation, No Rales, No Rhonchi, No Wheezes   Cardiovascular: S1, S2, Regular Rate and Rhythm   Gastrointestinal: Soft, Nontender, Nondistended   Extremities: No calf tenderness

## 2021-06-17 NOTE — PROGRESS NOTE ADULT - ASSESSMENT
ASSESSMENT/PLAN: HH 2 mF2 subarachnoid hemorrhage, post-bleed day 5; angio negative    TCDs, euvolemia, nimodipine  Repeat angio to assess for lesion and spasm  -200mmHg  Pain control  IVF as NPO p MN  Monitor Na+

## 2021-06-18 ENCOUNTER — APPOINTMENT (OUTPATIENT)
Dept: NEUROSURGERY | Facility: CLINIC | Age: 66
End: 2021-06-18
Payer: MEDICARE

## 2021-06-18 LAB
ANION GAP SERPL CALC-SCNC: 14 MMOL/L — SIGNIFICANT CHANGE UP (ref 5–17)
ANION GAP SERPL CALC-SCNC: 15 MMOL/L — SIGNIFICANT CHANGE UP (ref 5–17)
BUN SERPL-MCNC: 19 MG/DL — SIGNIFICANT CHANGE UP (ref 7–23)
BUN SERPL-MCNC: 21 MG/DL — SIGNIFICANT CHANGE UP (ref 7–23)
CALCIUM SERPL-MCNC: 8.8 MG/DL — SIGNIFICANT CHANGE UP (ref 8.4–10.5)
CALCIUM SERPL-MCNC: 9.2 MG/DL — SIGNIFICANT CHANGE UP (ref 8.4–10.5)
CHLORIDE SERPL-SCNC: 103 MMOL/L — SIGNIFICANT CHANGE UP (ref 96–108)
CHLORIDE SERPL-SCNC: 104 MMOL/L — SIGNIFICANT CHANGE UP (ref 96–108)
CO2 SERPL-SCNC: 18 MMOL/L — LOW (ref 22–31)
CO2 SERPL-SCNC: 20 MMOL/L — LOW (ref 22–31)
CREAT SERPL-MCNC: 0.68 MG/DL — SIGNIFICANT CHANGE UP (ref 0.5–1.3)
CREAT SERPL-MCNC: 0.87 MG/DL — SIGNIFICANT CHANGE UP (ref 0.5–1.3)
GLUCOSE SERPL-MCNC: 111 MG/DL — HIGH (ref 70–99)
GLUCOSE SERPL-MCNC: 155 MG/DL — HIGH (ref 70–99)
HCT VFR BLD CALC: 46.9 % — SIGNIFICANT CHANGE UP (ref 39–50)
HGB BLD-MCNC: 16.1 G/DL — SIGNIFICANT CHANGE UP (ref 13–17)
MAGNESIUM SERPL-MCNC: 2 MG/DL — SIGNIFICANT CHANGE UP (ref 1.6–2.6)
MAGNESIUM SERPL-MCNC: 2.2 MG/DL — SIGNIFICANT CHANGE UP (ref 1.6–2.6)
MCHC RBC-ENTMCNC: 31.6 PG — SIGNIFICANT CHANGE UP (ref 27–34)
MCHC RBC-ENTMCNC: 34.3 GM/DL — SIGNIFICANT CHANGE UP (ref 32–36)
MCV RBC AUTO: 92.1 FL — SIGNIFICANT CHANGE UP (ref 80–100)
MRSA PCR RESULT.: SIGNIFICANT CHANGE UP
NRBC # BLD: 0 /100 WBCS — SIGNIFICANT CHANGE UP (ref 0–0)
PHOSPHATE SERPL-MCNC: 2.9 MG/DL — SIGNIFICANT CHANGE UP (ref 2.5–4.5)
PHOSPHATE SERPL-MCNC: 3 MG/DL — SIGNIFICANT CHANGE UP (ref 2.5–4.5)
PLATELET # BLD AUTO: 213 K/UL — SIGNIFICANT CHANGE UP (ref 150–400)
POTASSIUM SERPL-MCNC: 4 MMOL/L — SIGNIFICANT CHANGE UP (ref 3.5–5.3)
POTASSIUM SERPL-MCNC: 4.2 MMOL/L — SIGNIFICANT CHANGE UP (ref 3.5–5.3)
POTASSIUM SERPL-SCNC: 4 MMOL/L — SIGNIFICANT CHANGE UP (ref 3.5–5.3)
POTASSIUM SERPL-SCNC: 4.2 MMOL/L — SIGNIFICANT CHANGE UP (ref 3.5–5.3)
RBC # BLD: 5.09 M/UL — SIGNIFICANT CHANGE UP (ref 4.2–5.8)
RBC # FLD: 12.2 % — SIGNIFICANT CHANGE UP (ref 10.3–14.5)
S AUREUS DNA NOSE QL NAA+PROBE: SIGNIFICANT CHANGE UP
SODIUM SERPL-SCNC: 135 MMOL/L — SIGNIFICANT CHANGE UP (ref 135–145)
SODIUM SERPL-SCNC: 139 MMOL/L — SIGNIFICANT CHANGE UP (ref 135–145)
WBC # BLD: 5.85 K/UL — SIGNIFICANT CHANGE UP (ref 3.8–10.5)
WBC # FLD AUTO: 5.85 K/UL — SIGNIFICANT CHANGE UP (ref 3.8–10.5)

## 2021-06-18 PROCEDURE — 99291 CRITICAL CARE FIRST HOUR: CPT

## 2021-06-18 PROCEDURE — 36223 PLACE CATH CAROTID/INOM ART: CPT | Mod: 59,RT

## 2021-06-18 PROCEDURE — 36227 PLACE CATH XTRNL CAROTID: CPT

## 2021-06-18 PROCEDURE — 36224 PLACE CATH CAROTD ART: CPT | Mod: LT

## 2021-06-18 PROCEDURE — 36226 PLACE CATH VERTEBRAL ART: CPT | Mod: 50

## 2021-06-18 RX ORDER — SODIUM CHLORIDE 9 MG/ML
1000 INJECTION INTRAMUSCULAR; INTRAVENOUS; SUBCUTANEOUS
Refills: 0 | Status: DISCONTINUED | OUTPATIENT
Start: 2021-06-18 | End: 2021-06-19

## 2021-06-18 RX ORDER — SODIUM CHLORIDE 9 MG/ML
2 INJECTION INTRAMUSCULAR; INTRAVENOUS; SUBCUTANEOUS EVERY 6 HOURS
Refills: 0 | Status: DISCONTINUED | OUTPATIENT
Start: 2021-06-18 | End: 2021-06-20

## 2021-06-18 RX ORDER — ACETAMINOPHEN 500 MG
1000 TABLET ORAL EVERY 6 HOURS
Refills: 0 | Status: DISCONTINUED | OUTPATIENT
Start: 2021-06-18 | End: 2021-06-22

## 2021-06-18 RX ORDER — LOSARTAN POTASSIUM 100 MG/1
50 TABLET, FILM COATED ORAL DAILY
Refills: 0 | Status: DISCONTINUED | OUTPATIENT
Start: 2021-06-18 | End: 2021-06-22

## 2021-06-18 RX ORDER — TRAMADOL HYDROCHLORIDE 50 MG/1
25 TABLET ORAL ONCE
Refills: 0 | Status: DISCONTINUED | OUTPATIENT
Start: 2021-06-18 | End: 2021-06-18

## 2021-06-18 RX ORDER — CHLORHEXIDINE GLUCONATE 213 G/1000ML
1 SOLUTION TOPICAL
Refills: 0 | Status: DISCONTINUED | OUTPATIENT
Start: 2021-06-18 | End: 2021-06-22

## 2021-06-18 RX ORDER — SODIUM CHLORIDE 9 MG/ML
500 INJECTION INTRAMUSCULAR; INTRAVENOUS; SUBCUTANEOUS ONCE
Refills: 0 | Status: COMPLETED | OUTPATIENT
Start: 2021-06-18 | End: 2021-06-18

## 2021-06-18 RX ADMIN — NIMODIPINE 60 MILLIGRAM(S): 60 SOLUTION ORAL at 13:17

## 2021-06-18 RX ADMIN — SODIUM CHLORIDE 2 GRAM(S): 9 INJECTION INTRAMUSCULAR; INTRAVENOUS; SUBCUTANEOUS at 23:29

## 2021-06-18 RX ADMIN — NIMODIPINE 60 MILLIGRAM(S): 60 SOLUTION ORAL at 17:59

## 2021-06-18 RX ADMIN — Medication 650 MILLIGRAM(S): at 04:11

## 2021-06-18 RX ADMIN — Medication 650 MILLIGRAM(S): at 04:41

## 2021-06-18 RX ADMIN — SODIUM CHLORIDE 2 GRAM(S): 9 INJECTION INTRAMUSCULAR; INTRAVENOUS; SUBCUTANEOUS at 17:59

## 2021-06-18 RX ADMIN — LOSARTAN POTASSIUM 50 MILLIGRAM(S): 100 TABLET, FILM COATED ORAL at 05:26

## 2021-06-18 RX ADMIN — Medication 1000 MILLIGRAM(S): at 23:29

## 2021-06-18 RX ADMIN — SODIUM CHLORIDE 1000 MILLILITER(S): 9 INJECTION INTRAMUSCULAR; INTRAVENOUS; SUBCUTANEOUS at 15:09

## 2021-06-18 RX ADMIN — CHLORHEXIDINE GLUCONATE 1 APPLICATION(S): 213 SOLUTION TOPICAL at 21:22

## 2021-06-18 RX ADMIN — NIMODIPINE 60 MILLIGRAM(S): 60 SOLUTION ORAL at 01:52

## 2021-06-18 RX ADMIN — SODIUM CHLORIDE 50 MILLILITER(S): 9 INJECTION INTRAMUSCULAR; INTRAVENOUS; SUBCUTANEOUS at 20:07

## 2021-06-18 RX ADMIN — NIMODIPINE 60 MILLIGRAM(S): 60 SOLUTION ORAL at 05:26

## 2021-06-18 RX ADMIN — TRAMADOL HYDROCHLORIDE 25 MILLIGRAM(S): 50 TABLET ORAL at 20:37

## 2021-06-18 RX ADMIN — NIMODIPINE 60 MILLIGRAM(S): 60 SOLUTION ORAL at 09:54

## 2021-06-18 RX ADMIN — SODIUM CHLORIDE 50 MILLILITER(S): 9 INJECTION INTRAMUSCULAR; INTRAVENOUS; SUBCUTANEOUS at 09:55

## 2021-06-18 RX ADMIN — Medication 1000 MILLIGRAM(S): at 23:59

## 2021-06-18 RX ADMIN — TRAMADOL HYDROCHLORIDE 25 MILLIGRAM(S): 50 TABLET ORAL at 20:07

## 2021-06-18 RX ADMIN — ENOXAPARIN SODIUM 40 MILLIGRAM(S): 100 INJECTION SUBCUTANEOUS at 17:59

## 2021-06-18 RX ADMIN — NIMODIPINE 60 MILLIGRAM(S): 60 SOLUTION ORAL at 21:22

## 2021-06-18 RX ADMIN — SENNA PLUS 2 TABLET(S): 8.6 TABLET ORAL at 21:22

## 2021-06-18 RX ADMIN — SODIUM CHLORIDE 50 MILLILITER(S): 5 INJECTION, SOLUTION INTRAVENOUS at 00:04

## 2021-06-18 NOTE — CHART NOTE - NSCHARTNOTEFT_GEN_A_CORE
Interventional Neuro- Radiology   Procedure Note      Procedure: Selective Cerebral Angiography   Pre- Procedure Diagnosis: SAH  Post- Procedure Diagnosis: negative for source of hemorrhage    : Dr. Ray MD  Fellow: Dr. Poppy MD  Physician Assistant: Patsy Galicai PA-C    RN: Katiana  Tech: Vish    Anesthesia: Dr. Jon MD (MAC)    I/Os:  Fluids: 200 cc DTV  Contrast: 58 cc  Estimated Blood Loss: <10cc    Preliminary Report:  Under MAC, using a 5Fr90 arrowflex sheath to the right groin examination of left vertebral artery/ left internal carotid artery/ left external carotid artery/ right vertebral artery/ right internal carotid artery/ right external carotid artery via selective cerebral angiography demonstrates negative study for source of hemorrhage. (Official note to follow).    Patient tolerated procedure well, vital signs stable, hemodynamically stable, no change in neurological status compared to baseline. Results discussed with neurosurgery/ patient and their family. Groin sheath d/c'ed, manual compression held to hemostasis, no active bleeding, no hematoma, vascade closure device applied, quick clot and safeguard balloon dressing applied at 16:45h. Patient transferred to MICU for further care/ monitoring.     Patsy Galicia PA-C

## 2021-06-18 NOTE — PRE-ANESTHESIA EVALUATION ADULT - NSRADCARDRESULTSFT_GEN_ALL_CORE
TTE 6/14/21: EF (3D Quantification): 57 %Doppler Peak Velocity (m/sec):  AoV=1.5  ------------------------------------------------------------------------  Observations:  Mitral Valve: Normal mitral valve. Mild mitral regurgitation.  Aortic Valve/Aorta: Calcified trileaflet aortic valve with normal opening. Peak transaortic valve gradient equals 9 mm Hg. Peak left ventricular outflow tract gradient equals 4 mm Hg.  Aortic Root: 3.1 cm.  Left Atrium: Normal left atrium.  LA volume index = 16 cc/m2.  Left Ventricle: Normal left ventricular systolic function. No segmental wall motion abnormalities. Increased relative wall thickness with normal left ventricular mass index, consistent with concentric left ventricular remodeling.   Normal diastolic function  Right Heart: Normal right atrium. Normal right ventricular size and function. Normal tricuspid valve. Minimal tricuspid regurgitation. Normal pulmonic valve.  Pericardium/Pleura: Normal pericardium with no pericardial effusion.  Hemodynamic: Estimated right atrial pressure is 8 mm Hg.  ------------------------------------------------------------------------  Conclusions:  1. Increased relative wall thickness with normal left ventricular mass index, consistent with concentric left  ventricular remodeling.  2. Normal left ventricular systolic function. No segmental wall motion abnormalities.  3. Normal right ventricular size and function.   *** No previous Echo exam.

## 2021-06-18 NOTE — PRE-ANESTHESIA EVALUATION ADULT - NSANTHPMHFT_GEN_ALL_CORE
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

## 2021-06-18 NOTE — PROGRESS NOTE ADULT - SUBJECTIVE AND OBJECTIVE BOX
65 year old male with PMHx of HTN (doesn't remember his home meds) presented on the morning od admission with severe HA, neck pain, and palpitations. At 10:30am he had palpitations then at 11:30am while working, he developed nausea, dizziness, and neck pain. Noted to be hypertensive on arrival to the hospital (205/123).  CT head and CTA showed Hyperattenuation in the prepontine cistern, left ambient cistern, and left lateral portion of the suprasellar cistern compatible with subarachnoid hemorrhage. 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  CT angiography brain was negative. Recommended for further angiogram evaluation.     HOSP COURSE  Angio done: No vascular malformation.    Overnight events:   Afebrile    REVIEW OF SYSTEMS: [] Unable to Assess due to neurologic exam   [ x] All ROS addressed below are non-contributory, except:  Neuro: [ x] Headache [ ] Back pain [ ] Numbness [ ] Weakness [ ] Ataxia [ ] Dizziness [ ] Aphasia [ ] Dysarthria [ ] Visual disturbance  Resp: [ ] Shortness of breath/dyspnea [ ] Orthopnea [ ] Cough  CV: [ ] Chest pain [ ] Palpitation [ ] Lightheadedness [ ] Syncope  Renal: [ ] Thirst [ ] Edema  GI: [ ] Nausea [ ] Emesis [ ] Abdominal pain [ ] Constipation [ ] Diarrhea  Hem: [ ] Hematemesis [ ] Bright red blood per rectum  ID: [ ] Fever [ ] Chills [ ] Dysuria  ENT: [ ] Rhinorrhea    ICU Vital Signs Last 24 Hrs  T(C): 36.5 (18 Jun 2021 04:00), Max: 37.4 (17 Jun 2021 16:00)  T(F): 97.7 (18 Jun 2021 04:00), Max: 99.4 (17 Jun 2021 16:00)  HR: 89 (18 Jun 2021 06:00) (73 - 108)  BP: 132/82 (18 Jun 2021 06:00) (109/72 - 150/101)  BP(mean): 102 (18 Jun 2021 06:00) (85 - 121)  RR: 15 (18 Jun 2021 06:00) (14 - 21)  SpO2: 94% (18 Jun 2021 06:00) (93% - 98%)      06-17-21 @ 07:01  -  06-18-21 @ 07:00  --------------------------------------------------------  IN: 1350 mL / OUT: 0 mL / NET: 1350 mL      acetaminophen   Tablet .. 650 milliGRAM(s) Oral every 6 hours PRN  chlorhexidine 4% Liquid 1 Application(s) Topical <User Schedule>  enoxaparin Injectable 40 milliGRAM(s) SubCutaneous <User Schedule>  losartan 50 milliGRAM(s) Oral daily  niMODipine 60 milliGRAM(s) Oral every 4 hours  polyethylene glycol 3350 17 Gram(s) Oral two times a day  senna 2 Tablet(s) Oral at bedtime  sodium chloride 2% . 1000 milliLiter(s) (50 mL/Hr) IV Continuous <Continuous>      LABS:  Na: 133 (06-17 @ 21:22), 136 (06-16 @ 21:38), 136 (06-15 @ 23:17)  K: 4.4 (06-17 @ 21:22), 3.9 (06-16 @ 21:38), 3.6 (06-15 @ 23:17)  Cl: 101 (06-17 @ 21:22), 101 (06-16 @ 21:38), 100 (06-15 @ 23:17)  CO2: 21 (06-17 @ 21:22), 19 (06-16 @ 21:38), 20 (06-15 @ 23:17)  BUN: 25 (06-17 @ 21:22), 20 (06-16 @ 21:38), 18 (06-15 @ 23:17)  Cr: 1.20 (06-17 @ 21:22), 0.96 (06-16 @ 21:38), 0.79 (06-15 @ 23:17)  Glu: 112(06-17 @ 21:22), 109(06-16 @ 21:38), 106(06-15 @ 23:17)    Hgb: 17.2 (06-17 @ 21:22), 17.0 (06-15 @ 23:17)  Hct: 51.0 (06-17 @ 21:22), 49.0 (06-15 @ 23:17)  WBC: 6.91 (06-17 @ 21:22), 7.01 (06-15 @ 23:17)  Plt: 207 (06-17 @ 21:22), 199 (06-15 @ 23:17)    INR: 1.04 06-17-21 @ 21:22  PTT: 36.1 06-17-21 @ 21:22      PHYSICAL EXAM:  General: No Acute Distress   Neurological: Awake, alert oriented to person, place and time, Following Commands, PERRLA, EOMI, Face Symmetrical, Speech Fluent, Moving all extremities, Muscle Strength normal in all four extremities, No Drift, Sensation to Light Touch Intact  Pulmonary: Clear to Auscultation, No Rales, No Rhonchi, No Wheezes   Cardiovascular: S1, S2, Regular Rate and Rhythm   Gastrointestinal: Soft, Nontender, Nondistended   Extremities: No calf tenderness

## 2021-06-18 NOTE — PRE-ANESTHESIA EVALUATION ADULT - NSANTHOSAYNRD_GEN_A_CORE
No. ESPERANZA screening performed.  STOP BANG Legend: 0-2 = LOW Risk; 3-4 = INTERMEDIATE Risk; 5-8 = HIGH Risk
No. ESPERANZA screening performed.  STOP BANG Legend: 0-2 = LOW Risk; 3-4 = INTERMEDIATE Risk; 5-8 = HIGH Risk

## 2021-06-18 NOTE — PROGRESS NOTE ADULT - SUBJECTIVE AND OBJECTIVE BOX
SUMMARY:   65 year-old man with HTN admitted for HH2 mF2 subarachnoid hemorrhage on 6/12 with angio negative for vascular malformation.     24 HOUR EVENTS:   Angio without vascular lesion.    VITALS/DATA/ORDERS: [x] Reviewed    EXAMINATION:   Awake, alert, fully oriented, follows, no drift, full strength; groin without hematoma, feet warm

## 2021-06-18 NOTE — PROGRESS NOTE ADULT - ASSESSMENT
ASSESSMENT/PLAN: HH 2 mF2 subarachnoid hemorrhage, post-bleed day 6; angio negative x 2    TCDs, euvolemia, nimodipine  -200mmHg  Pain control  IVF as NPO p MN  Monitor Na+     At risk of death/neuro decline due to delayed cerebral ischemia

## 2021-06-18 NOTE — PROGRESS NOTE ADULT - ASSESSMENT
A/P: SAH likely non- aneurysmal CTA is neg. PBD 6  Angio negative   mFS 2, HHS 2  NIHSS 0     Neuro:   DSA neg for vascular malformation   Still suspicious for possible underlying aneurysm; will repeat angio on 6/18.  Repeat angio pending 6/18  Neurochecks Q2 hr  MRI cervical spine and brain no vascular malformation   Nimodipine, keppra 500 mg BID for seizure prophylaxis   TCDs. Currently wnl  Target euvolemia  Ambulating     Respiratory:   RA    CV: Chest pain  EKG, trops negative  TTE normal EF  -160 mmhg   Losartan 50mg started    Endocrine:   Keep sugar 120-180 mmhg     Heme/Onc: INR <1.5, Plt>100             DVT ppx: SCD, lovenox    Renal:   2% started re Na 133.   Keep eunatremic  Na goal 135- 145    ID:   Afebrile    GI:   Regular diet  Protonix, NG, colace    LBM: 6/15    Social/Family:   Updated at bedside    Discharge planning: ICU    Code Status: [x] Full Code [] DNR [] DNI [] Goals of Care:     Disposition: [x] ICU [] Stroke Unit [] RCU []PCU []Floor [] Discharge Home      Patient at high risk for neurologic deterioration, aneurysm rerupture, seizures               A/P: SAH likely non- aneurysmal CTA is neg. PBD 6  Angio negative   mFS 2, HHS 2  NIHSS 0     Neuro:   DSA neg for vascular malformation   Still suspicious for possible underlying aneurysm; will repeat angio on 6/18.  Repeat angio pending 6/18  Neurochecks Q2 hr  MRI cervical spine and brain no vascular malformation   Nimodipine, keppra 500 mg BID for seizure prophylaxis   TCDs. Currently wnl  Target euvolemia. 500cc bolus.   Ambulating .     Respiratory:   RA  Incentive spirometry prn    CV: Chest pain  EKG, trops negative  TTE normal EF  -160 mmhg   Losartan 50mg    Endocrine:   Keep sugar 120-180 mmhg     Heme/Onc: Polycythemia.   Monitor Hb. Likely related to volume depletion  Anti Xa: 0.35  INR <1.5, Plt>100             DVT ppx: SCD, lovenox    Renal:   2% started re: Na 133.   Keep eunatremic  Na goal 135- 145  BMPs Q12    ID:   Afebrile    GI:   Regular diet  Protonix, NG, colace    LBM: 6/15    Social/Family:   Updated at bedside    Discharge planning: ICU    Code Status: [x] Full Code [] DNR [] DNI [] Goals of Care:     Disposition: [x] ICU [] Stroke Unit [] RCU []PCU []Floor [] Discharge Home      Patient at high risk for neurologic deterioration, aneurysm rerupture, seizures

## 2021-06-19 LAB
ANION GAP SERPL CALC-SCNC: 14 MMOL/L — SIGNIFICANT CHANGE UP (ref 5–17)
BUN SERPL-MCNC: 16 MG/DL — SIGNIFICANT CHANGE UP (ref 7–23)
CALCIUM SERPL-MCNC: 9.6 MG/DL — SIGNIFICANT CHANGE UP (ref 8.4–10.5)
CHLORIDE SERPL-SCNC: 102 MMOL/L — SIGNIFICANT CHANGE UP (ref 96–108)
CO2 SERPL-SCNC: 22 MMOL/L — SIGNIFICANT CHANGE UP (ref 22–31)
CREAT SERPL-MCNC: 0.78 MG/DL — SIGNIFICANT CHANGE UP (ref 0.5–1.3)
GLUCOSE SERPL-MCNC: 100 MG/DL — HIGH (ref 70–99)
MAGNESIUM SERPL-MCNC: 2.1 MG/DL — SIGNIFICANT CHANGE UP (ref 1.6–2.6)
PHOSPHATE SERPL-MCNC: 3.4 MG/DL — SIGNIFICANT CHANGE UP (ref 2.5–4.5)
POTASSIUM SERPL-MCNC: 4.4 MMOL/L — SIGNIFICANT CHANGE UP (ref 3.5–5.3)
POTASSIUM SERPL-SCNC: 4.4 MMOL/L — SIGNIFICANT CHANGE UP (ref 3.5–5.3)
SODIUM SERPL-SCNC: 138 MMOL/L — SIGNIFICANT CHANGE UP (ref 135–145)

## 2021-06-19 PROCEDURE — 70450 CT HEAD/BRAIN W/O DYE: CPT | Mod: 26

## 2021-06-19 PROCEDURE — 99233 SBSQ HOSP IP/OBS HIGH 50: CPT

## 2021-06-19 RX ADMIN — Medication 1000 MILLIGRAM(S): at 15:29

## 2021-06-19 RX ADMIN — SODIUM CHLORIDE 2 GRAM(S): 9 INJECTION INTRAMUSCULAR; INTRAVENOUS; SUBCUTANEOUS at 23:06

## 2021-06-19 RX ADMIN — NIMODIPINE 60 MILLIGRAM(S): 60 SOLUTION ORAL at 22:10

## 2021-06-19 RX ADMIN — NIMODIPINE 60 MILLIGRAM(S): 60 SOLUTION ORAL at 09:08

## 2021-06-19 RX ADMIN — Medication 1000 MILLIGRAM(S): at 16:00

## 2021-06-19 RX ADMIN — SODIUM CHLORIDE 2 GRAM(S): 9 INJECTION INTRAMUSCULAR; INTRAVENOUS; SUBCUTANEOUS at 05:13

## 2021-06-19 RX ADMIN — LOSARTAN POTASSIUM 50 MILLIGRAM(S): 100 TABLET, FILM COATED ORAL at 05:13

## 2021-06-19 RX ADMIN — NIMODIPINE 60 MILLIGRAM(S): 60 SOLUTION ORAL at 02:07

## 2021-06-19 RX ADMIN — NIMODIPINE 60 MILLIGRAM(S): 60 SOLUTION ORAL at 15:30

## 2021-06-19 RX ADMIN — SODIUM CHLORIDE 2 GRAM(S): 9 INJECTION INTRAMUSCULAR; INTRAVENOUS; SUBCUTANEOUS at 18:00

## 2021-06-19 RX ADMIN — SODIUM CHLORIDE 2 GRAM(S): 9 INJECTION INTRAMUSCULAR; INTRAVENOUS; SUBCUTANEOUS at 12:13

## 2021-06-19 RX ADMIN — CHLORHEXIDINE GLUCONATE 1 APPLICATION(S): 213 SOLUTION TOPICAL at 21:23

## 2021-06-19 RX ADMIN — NIMODIPINE 60 MILLIGRAM(S): 60 SOLUTION ORAL at 05:13

## 2021-06-19 RX ADMIN — SENNA PLUS 2 TABLET(S): 8.6 TABLET ORAL at 21:23

## 2021-06-19 RX ADMIN — ENOXAPARIN SODIUM 40 MILLIGRAM(S): 100 INJECTION SUBCUTANEOUS at 18:00

## 2021-06-19 RX ADMIN — NIMODIPINE 60 MILLIGRAM(S): 60 SOLUTION ORAL at 18:00

## 2021-06-19 NOTE — PROGRESS NOTE ADULT - SUBJECTIVE AND OBJECTIVE BOX
65 year old male with PMHx of HTN (doesn't remember his home meds) presented on the morning od admission with severe HA, neck pain, and palpitations. At 10:30am he had palpitations then at 11:30am while working, he developed nausea, dizziness, and neck pain. Noted to be hypertensive on arrival to the hospital (205/123).  CT head and CTA showed Hyperattenuation in the prepontine cistern, left ambient cistern, and left lateral portion of the suprasellar cistern compatible with subarachnoid hemorrhage. 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  CT angiography brain was negative. Recommended for further angiogram evaluation.     HOSP COURSE  Angio done: No vascular malformation.    Overnight events:   Afebrile    REVIEW OF SYSTEMS: [] Unable to Assess due to neurologic exam   [ x] All ROS addressed below are non-contributory, except:  Neuro: [ x] Headache [ ] Back pain [ ] Numbness [ ] Weakness [ ] Ataxia [ ] Dizziness [ ] Aphasia [ ] Dysarthria [ ] Visual disturbance  Resp: [ ] Shortness of breath/dyspnea [ ] Orthopnea [ ] Cough  CV: [ ] Chest pain [ ] Palpitation [ ] Lightheadedness [ ] Syncope  Renal: [ ] Thirst [ ] Edema  GI: [ ] Nausea [ ] Emesis [ ] Abdominal pain [ ] Constipation [ ] Diarrhea  Hem: [ ] Hematemesis [ ] Bright red blood per rectum  ID: [ ] Fever [ ] Chills [ ] Dysuria  ENT: [ ] Rhinorrhea            ICU Vital Signs Last 24 Hrs  T(C): 36.8 (19 Jun 2021 04:00), Max: 36.9 (18 Jun 2021 14:58)  T(F): 98.2 (19 Jun 2021 04:00), Max: 98.4 (18 Jun 2021 14:58)  HR: 69 (19 Jun 2021 07:00) (67 - 104)  BP: 130/80 (19 Jun 2021 07:00) (114/68 - 180/89)  BP(mean): 99 (19 Jun 2021 07:00) (85 - 124)  ABP: --  ABP(mean): --  RR: 12 (19 Jun 2021 07:00) (12 - 24)  SpO2: 95% (19 Jun 2021 07:00) (94% - 98%)      06-18-21 @ 07:01  -  06-19-21 @ 07:00  --------------------------------------------------------  IN: 1720 mL / OUT: 1680 mL / NET: 40 mL        acetaminophen   Tablet .. 1000 milliGRAM(s) Oral every 6 hours PRN  chlorhexidine 4% Liquid 1 Application(s) Topical <User Schedule>  enoxaparin Injectable 40 milliGRAM(s) SubCutaneous <User Schedule>  losartan 50 milliGRAM(s) Oral daily  niMODipine 60 milliGRAM(s) Oral every 4 hours  polyethylene glycol 3350 17 Gram(s) Oral two times a day  senna 2 Tablet(s) Oral at bedtime  sodium chloride 2 Gram(s) Oral every 6 hours  sodium chloride 0.9%. 1000 milliLiter(s) (50 mL/Hr) IV Continuous <Continuous>                          16.1   5.85  )-----------( 213      ( 18 Jun 2021 21:47 )             46.9     06-18    135  |  103  |  19  ----------------------------<  155<H>  4.0   |  18<L>  |  0.68    Ca    8.8      18 Jun 2021 21:47  Phos  2.9     06-18  Mg     2.0     06-18            PHYSICAL EXAM:  General: No Acute Distress   Neurological: Awake, alert oriented to person, place and time, Following Commands, PERRLA, EOMI, Face Symmetrical, Speech Fluent, Moving all extremities, Muscle Strength normal in all four extremities, No Drift, Sensation to Light Touch Intact  Pulmonary: Clear to Auscultation, No Rales, No Rhonchi, No Wheezes   Cardiovascular: S1, S2, Regular Rate and Rhythm   Gastrointestinal: Soft, Nontender, Nondistended   Extremities: No calf tenderness     65 year old male with PMHx of HTN (doesn't remember his home meds) presented on the morning od admission with severe HA, neck pain, and palpitations. At 10:30am he had palpitations then at 11:30am while working, he developed nausea, dizziness, and neck pain. Noted to be hypertensive on arrival to the hospital (205/123).  CT head and CTA showed Hyperattenuation in the prepontine cistern, left ambient cistern, and left lateral portion of the suprasellar cistern compatible with subarachnoid hemorrhage. 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  CT angiography brain was negative. Recommended for further angiogram evaluation.     HOSP COURSE  Angio done: No vascular malformation.    Overnight events:   Afebrile    REVIEW OF SYSTEMS: [] Unable to Assess due to neurologic exam   [ x] All ROS addressed below are non-contributory, except:  Neuro: [ x] Headache [ ] Back pain [ ] Numbness [ ] Weakness [ ] Ataxia [ ] Dizziness [ ] Aphasia [ ] Dysarthria [ ] Visual disturbance  Resp: [ ] Shortness of breath/dyspnea [ ] Orthopnea [ ] Cough  CV: [ ] Chest pain [ ] Palpitation [ ] Lightheadedness [ ] Syncope  Renal: [ ] Thirst [ ] Edema  GI: [ ] Nausea [ ] Emesis [ ] Abdominal pain [ ] Constipation [ ] Diarrhea  Hem: [ ] Hematemesis [ ] Bright red blood per rectum  ID: [ ] Fever [ ] Chills [ ] Dysuria  ENT: [ ] Rhinorrhea       Review of Systems:  Other Review of Systems: All other review of systems negative, except as noted in HPI      Allergies and Intolerances:        Allergies:  	No Known Allergies:     Home Medications:   * Outpatient Medication Status not yet specified    .    Patient History:    Past Medical, Past Surgical, and Family History:  No pertinent past medical history.     No significant past surgical history.     No pertinent family history in first degree relatives.     No Pertinent Family History in first degree relatives of: Parents.      ICU Vital Signs Last 24 Hrs  T(C): 36.8 (19 Jun 2021 04:00), Max: 36.9 (18 Jun 2021 14:58)  T(F): 98.2 (19 Jun 2021 04:00), Max: 98.4 (18 Jun 2021 14:58)  HR: 69 (19 Jun 2021 07:00) (67 - 104)  BP: 130/80 (19 Jun 2021 07:00) (114/68 - 180/89)  BP(mean): 99 (19 Jun 2021 07:00) (85 - 124)  RR: 12 (19 Jun 2021 07:00) (12 - 24)  SpO2: 95% (19 Jun 2021 07:00) (94% - 98%)      06-18-21 @ 07:01  -  06-19-21 @ 07:00  --------------------------------------------------------  IN: 1720 mL / OUT: 1680 mL / NET: 40 mL        acetaminophen   Tablet .. 1000 milliGRAM(s) Oral every 6 hours PRN  chlorhexidine 4% Liquid 1 Application(s) Topical <User Schedule>  enoxaparin Injectable 40 milliGRAM(s) SubCutaneous <User Schedule>  losartan 50 milliGRAM(s) Oral daily  niMODipine 60 milliGRAM(s) Oral every 4 hours  polyethylene glycol 3350 17 Gram(s) Oral two times a day  senna 2 Tablet(s) Oral at bedtime  sodium chloride 2 Gram(s) Oral every 6 hours  sodium chloride 0.9%. 1000 milliLiter(s) (50 mL/Hr) IV Continuous <Continuous>                          16.1   5.85  )-----------( 213      ( 18 Jun 2021 21:47 )             46.9     06-18    135  |  103  |  19  ----------------------------<  155<H>  4.0   |  18<L>  |  0.68    Ca    8.8      18 Jun 2021 21:47  Phos  2.9     06-18  Mg     2.0     06-18      PHYSICAL EXAM:  General: No Acute Distress   Neurological: Awake, alert oriented to person, place and time, Following Commands, PERRLA, EOMI, Face Symmetrical, Speech Fluent, Moving all extremities, Muscle Strength normal in all four extremities, No Drift, Sensation to Light Touch Intact  Pulmonary: Clear to Auscultation, No Rales, No Rhonchi, No Wheezes   Cardiovascular: S1, S2, Regular Rate and Rhythm   Gastrointestinal: Soft, Nontender, Nondistended   Extremities: No calf tenderness

## 2021-06-19 NOTE — PROGRESS NOTE ADULT - ASSESSMENT
A/P: SAH likely non- aneurysmal CTA is neg. PBD 7  Angio negative   mFS 2, HHS 2  NIHSS 0     Neuro:   DSA neg for vascular malformation   Still suspicious for possible underlying aneurysm; will repeat angio on 6/18.  Repeat angio 6/18 : No vasc abn  Neurochecks Q2 hr  MRI cervical spine and brain no vascular malformation   Nimodipine, keppra 500 mg BID for seizure prophylaxis   TCDs. Currently wnl  Target euvolemia. 500cc bolus.   Ambulating .     Respiratory:   RA  Incentive spirometry prn    CV: Chest pain  EKG, trops negative  TTE normal EF  -160 mmhg   Losartan 50mg    Endocrine:   Keep sugar 120-180 mmhg     Heme/Onc: Polycythemia.   Monitor Hb. Likely related to volume depletion  Anti Xa: 0.35  INR <1.5, Plt>100             DVT ppx: SCD, lovenox    Renal:   2% started re: Na 133.   Keep eunatremic  Na goal 135- 145  BMPs Q12    ID:   Afebrile    GI:   Regular diet  Protonix, NG, colace    LBM: 6/15    Social/Family:   Updated at bedside    Discharge planning: ICU    Code Status: [x] Full Code [] DNR [] DNI [] Goals of Care:     Disposition: [x] ICU [] Stroke Unit [] RCU []PCU []Floor [] Discharge Home      Patient at high risk for neurologic deterioration, aneurysm rerupture, seizures               A/P: SAH likely non- aneurysmal CTA is neg. PBD 7  Angio negative   mFS 2, HHS 2  NIHSS 0     Neuro:   DSA neg for vascular malformation   Still suspicious for possible underlying aneurysm; will repeat angio on 6/18.  Repeat angio 6/18 : No vasc abn  Neurochecks Q2 hr  MRI cervical spine and brain no vascular malformation   Nimodipine, keppra 500 mg BID for seizure prophylaxis   TCDs. Currently wnl  Target euvolemia. 500cc bolus.   Ambulating .     Respiratory:   RA  Incentive spirometry prn    CV: Chest pain  EKG, trops negative  TTE normal EF  -160 mmhg   Losartan 50mg    Endocrine:   Keep sugar 120-180 mmhg     Heme/Onc: Polycythemia.   Monitor Hb. Likely related to volume depletion  Anti Xa: 0.35  INR <1.5, Plt>100             DVT ppx: SCD, lovenox    Renal:   2% started re: Na 133.   Keep eunatremic  Na goal 135- 145  BMPs Q12    ID:   Afebrile    GI:   Regular diet  Protonix, NG, colace    LBM: 6/15    Social/Family:   Updated at bedside    Discharge planning: ICU    Code Status: [x] Full Code [] DNR [] DNI [] Goals of Care:              A/P: SAH likely non- aneurysmal CTA is neg. PBD 7  Angio negative   mFS 2, HHS 2  NIHSS 0     Neuro:   Initial DSA neg for vascular malformation   Still suspicious for possible underlying aneurysm. Repeat angio 6/18 : No vasc abn  Neurochecks Q2 hr  MRI cervical spine and brain no vascular malformation   Nimodipine  DC keppra.   TCDs. Currently wnl  Ambulating.     Respiratory:   RA  Incentive spirometry prn    CV: Chest pain  EKG, trops negative  TTE: normal EF  -160 mmhg   Losartan 50mg    Endocrine:   Keep sugar 120-180 mmhg     Heme/Onc: Polycythemia.   Monitor Hb. Likely related to volume depletion. Improved s/p volume resuscitation.   Anti Xa: 0.35  INR <1.5, Plt>100             DVT ppx: SCD, lovenox    Renal:   Hyponatremia resolved. IVL  Keep eunatremic  Na goal 135- 145  BMPs Q daily    ID:   Afebrile    GI:   Regular diet  Protonix, NG, colace    LBM: 6/19    Social/Family:   Updated at bedside    Discharge planning: ICU vs floor    Patient is no longer critically ill    Code Status: [x] Full Code [] DNR [] DNI [] Goals of Care:

## 2021-06-20 LAB
ANION GAP SERPL CALC-SCNC: 13 MMOL/L — SIGNIFICANT CHANGE UP (ref 5–17)
BUN SERPL-MCNC: 18 MG/DL — SIGNIFICANT CHANGE UP (ref 7–23)
CALCIUM SERPL-MCNC: 9.4 MG/DL — SIGNIFICANT CHANGE UP (ref 8.4–10.5)
CHLORIDE SERPL-SCNC: 101 MMOL/L — SIGNIFICANT CHANGE UP (ref 96–108)
CO2 SERPL-SCNC: 21 MMOL/L — LOW (ref 22–31)
CREAT SERPL-MCNC: 0.86 MG/DL — SIGNIFICANT CHANGE UP (ref 0.5–1.3)
GLUCOSE SERPL-MCNC: 106 MG/DL — HIGH (ref 70–99)
MAGNESIUM SERPL-MCNC: 2 MG/DL — SIGNIFICANT CHANGE UP (ref 1.6–2.6)
PHOSPHATE SERPL-MCNC: 2.5 MG/DL — SIGNIFICANT CHANGE UP (ref 2.5–4.5)
POTASSIUM SERPL-MCNC: 3.9 MMOL/L — SIGNIFICANT CHANGE UP (ref 3.5–5.3)
POTASSIUM SERPL-SCNC: 3.9 MMOL/L — SIGNIFICANT CHANGE UP (ref 3.5–5.3)
SODIUM SERPL-SCNC: 135 MMOL/L — SIGNIFICANT CHANGE UP (ref 135–145)

## 2021-06-20 PROCEDURE — 99233 SBSQ HOSP IP/OBS HIGH 50: CPT

## 2021-06-20 RX ORDER — SODIUM CHLORIDE 9 MG/ML
1 INJECTION INTRAMUSCULAR; INTRAVENOUS; SUBCUTANEOUS EVERY 8 HOURS
Refills: 0 | Status: DISCONTINUED | OUTPATIENT
Start: 2021-06-20 | End: 2021-06-22

## 2021-06-20 RX ORDER — SODIUM CHLORIDE 9 MG/ML
1000 INJECTION INTRAMUSCULAR; INTRAVENOUS; SUBCUTANEOUS ONCE
Refills: 0 | Status: COMPLETED | OUTPATIENT
Start: 2021-06-20 | End: 2021-06-20

## 2021-06-20 RX ADMIN — SODIUM CHLORIDE 1 GRAM(S): 9 INJECTION INTRAMUSCULAR; INTRAVENOUS; SUBCUTANEOUS at 16:00

## 2021-06-20 RX ADMIN — SODIUM CHLORIDE 1 GRAM(S): 9 INJECTION INTRAMUSCULAR; INTRAVENOUS; SUBCUTANEOUS at 22:04

## 2021-06-20 RX ADMIN — LOSARTAN POTASSIUM 50 MILLIGRAM(S): 100 TABLET, FILM COATED ORAL at 06:08

## 2021-06-20 RX ADMIN — SODIUM CHLORIDE 2 GRAM(S): 9 INJECTION INTRAMUSCULAR; INTRAVENOUS; SUBCUTANEOUS at 06:08

## 2021-06-20 RX ADMIN — Medication 1000 MILLIGRAM(S): at 11:30

## 2021-06-20 RX ADMIN — Medication 1000 MILLIGRAM(S): at 20:30

## 2021-06-20 RX ADMIN — NIMODIPINE 60 MILLIGRAM(S): 60 SOLUTION ORAL at 22:04

## 2021-06-20 RX ADMIN — Medication 1000 MILLIGRAM(S): at 21:00

## 2021-06-20 RX ADMIN — NIMODIPINE 60 MILLIGRAM(S): 60 SOLUTION ORAL at 16:00

## 2021-06-20 RX ADMIN — SENNA PLUS 2 TABLET(S): 8.6 TABLET ORAL at 22:05

## 2021-06-20 RX ADMIN — Medication 1000 MILLIGRAM(S): at 11:05

## 2021-06-20 RX ADMIN — CHLORHEXIDINE GLUCONATE 1 APPLICATION(S): 213 SOLUTION TOPICAL at 22:05

## 2021-06-20 RX ADMIN — POLYETHYLENE GLYCOL 3350 17 GRAM(S): 17 POWDER, FOR SOLUTION ORAL at 18:54

## 2021-06-20 RX ADMIN — NIMODIPINE 60 MILLIGRAM(S): 60 SOLUTION ORAL at 18:54

## 2021-06-20 RX ADMIN — NIMODIPINE 60 MILLIGRAM(S): 60 SOLUTION ORAL at 06:08

## 2021-06-20 RX ADMIN — ENOXAPARIN SODIUM 40 MILLIGRAM(S): 100 INJECTION SUBCUTANEOUS at 18:54

## 2021-06-20 RX ADMIN — NIMODIPINE 60 MILLIGRAM(S): 60 SOLUTION ORAL at 11:05

## 2021-06-20 RX ADMIN — Medication 1000 MILLIGRAM(S): at 03:00

## 2021-06-20 RX ADMIN — NIMODIPINE 60 MILLIGRAM(S): 60 SOLUTION ORAL at 02:34

## 2021-06-20 RX ADMIN — Medication 1000 MILLIGRAM(S): at 03:30

## 2021-06-20 RX ADMIN — SODIUM CHLORIDE 1000 MILLILITER(S): 9 INJECTION INTRAMUSCULAR; INTRAVENOUS; SUBCUTANEOUS at 09:00

## 2021-06-20 NOTE — PROGRESS NOTE ADULT - SUBJECTIVE AND OBJECTIVE BOX
65 year old male with PMHx of HTN (doesn't remember his home meds) presented on the morning od admission with severe HA, neck pain, and palpitations. At 10:30am he had palpitations then at 11:30am while working, he developed nausea, dizziness, and neck pain. Noted to be hypertensive on arrival to the hospital (205/123).  CT head and CTA showed Hyperattenuation in the prepontine cistern, left ambient cistern, and left lateral portion of the suprasellar cistern compatible with subarachnoid hemorrhage. 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  CT angiography brain was negative. Recommended for further angiogram evaluation.     HOSP COURSE  Angio done: No vascular malformation.    Overnight events:   Afebrile    REVIEW OF SYSTEMS: [] Unable to Assess due to neurologic exam   [ x] All ROS addressed below are non-contributory, except:  Neuro: [ x] Headache [ ] Back pain [ ] Numbness [ ] Weakness [ ] Ataxia [ ] Dizziness [ ] Aphasia [ ] Dysarthria [ ] Visual disturbance  Resp: [ ] Shortness of breath/dyspnea [ ] Orthopnea [ ] Cough  CV: [ ] Chest pain [ ] Palpitation [ ] Lightheadedness [ ] Syncope  Renal: [ ] Thirst [ ] Edema  GI: [ ] Nausea [ ] Emesis [ ] Abdominal pain [ ] Constipation [ ] Diarrhea  Hem: [ ] Hematemesis [ ] Bright red blood per rectum  ID: [ ] Fever [ ] Chills [ ] Dysuria  ENT: [ ] Rhinorrhea       Review of Systems:  Other Review of Systems: All other review of systems negative, except as noted in HPI      Allergies and Intolerances:        Allergies:  	No Known Allergies:     Home Medications:   * Outpatient Medication Status not yet specified    .    Patient History:    Past Medical, Past Surgical, and Family History:  No pertinent past medical history.     No significant past surgical history.     No pertinent family history in first degree relatives.     No Pertinent Family History in first degree relatives of: Parents.      PHYSICAL EXAM:  General: No Acute Distress   Neurological: Awake, alert oriented to person, place and time, Following Commands, PERRLA, EOMI, Face Symmetrical, Speech Fluent, Moving all extremities, Muscle Strength normal in all four extremities, No Drift, Sensation to Light Touch Intact  Pulmonary: Clear to Auscultation, No Rales, No Rhonchi, No Wheezes   Cardiovascular: S1, S2, Regular Rate and Rhythm   Gastrointestinal: Soft, Nontender, Nondistended   Extremities: No calf tenderness

## 2021-06-20 NOTE — PROGRESS NOTE ADULT - SUBJECTIVE AND OBJECTIVE BOX
SUMMARY:   65 year-old man with history of hypertension developed severe headache, neck pain, and palpitations with work-up revealing prepontine cistern, left ambient cistern, and left lateral portion of the suprasellar cistern subarachnoid hemorrhage.     HOSPITAL COURSE  Angio x 2 negative for aneurysm.     24 HOUR EVENTS:  CT head with decreased subarachnoid hemorrhage.     REVIEW OF SYSTEMS: [] Unable to Assess due to neurologic exam   [x] All ROS addressed below are non-contributory, except:  Neuro: [x] Headache, improved [ ] Back pain [ ] Numbness [ ] Weakness [ ] Ataxia [ ] Dizziness [ ] Aphasia [ ] Dysarthria [ ] Visual disturbance  Resp: [ ] Shortness of breath/dyspnea [ ] Orthopnea [ ] Cough  CV: [ ] Chest pain [ ] Palpitation [ ] Lightheadedness [ ] Syncope  Renal: [ ] Thirst [ ] Edema  GI: [ ] Nausea [ ] Emesis [ ] Abdominal pain [ ] Constipation [ ] Diarrhea  Hem: [ ] Hematemesis [ ] Bright red blood per rectum  ID: [ ] Fever [ ] Chills [ ] Dysuria  ENT: [ ] Rhinorrhea    VITALS/DATA/ORDERS: [x] Reviewed    PHYSICAL EXAM:  General: Cooperative  Neurological: Awake, alert oriented to person, place and time, Following Commands, PERRLA, EOMI, Face Symmetrical, Speech Fluent, Moving all extremities, Muscle Strength normal in all four extremities, No Drift, Sensation to Light Touch Intact intact  Pulmonary: Clear to Auscultation, No Rales, No Rhonchi, No Wheezes   Cardiovascular: S1, S2, Regular Rate and Rhythm   Gastrointestinal: Soft, Nontender, Nondistended   Extremities: No calf tenderness

## 2021-06-20 NOTE — PROGRESS NOTE ADULT - ASSESSMENT
ASSESSMENT/PLAN: SAH, likely non-aneurysmal, PBD 8  mFS 2, HHS 2  NIHSS 0     Neuro:   Nimodipine  Pain control  PT/OT  TCDs

## 2021-06-20 NOTE — PROGRESS NOTE ADULT - ASSESSMENT
A/P: SAH likely non- aneurysmal CTA is neg. PBD 8  Angio negative   mFS 2, HHS 2  NIHSS 0     Neuro:   Initial DSA neg for vascular malformation   Still suspicious for possible underlying aneurysm. Repeat angio 6/18 : No vasc abn  Neurochecks Q2 hr  MRI cervical spine and brain no vascular malformation   Nimodipine  DC keppra.   TCDs. Currently wnl  Ambulating.     Respiratory:   RA  Incentive spirometry prn    CV: Chest pain  EKG, trops negative  TTE: normal EF  -160 mmhg   Losartan 50mg    Endocrine:   Keep sugar 120-180 mmhg     Heme/Onc: Polycythemia.   Monitor Hb. Likely related to volume depletion. Improved s/p volume resuscitation.   Anti Xa: 0.35  INR <1.5, Plt>100             DVT ppx: SCD, lovenox    Renal:   Hyponatremia resolved. IVL  Keep eunatremic  Na goal 135- 145  BMPs Q daily    ID:   Afebrile    GI:   Regular diet  Protonix, NG, colace    LBM: 6/19    Social/Family:   Updated at bedside    Discharge planning: ICU vs floor    Patient is no longer critically ill    Code Status: [x] Full Code [] DNR [] DNI [] Goals of Care:              A/P: SAH likely non- aneurysmal CTA is neg. PBD 8  Angio negative x2  mFS 2, HHS 2  NIHSS 0     Neuro:   Initial DSA neg for vascular malformation   Still suspicious for possible underlying aneurysm. Repeat angio 6/18 : No vasc abn  Neurochecks Q4 hr  MRI cervical spine and brain no vascular malformation   Nimodipine  off aed ppx  TCDs. Currently wnl  Ambulating.     Respiratory:   RA  Incentive spirometry prn    CV: Chest pain  EKG, trops negative  TTE: normal EF  -160 mmhg   Losartan 50mg    Endocrine:   Keep sugar 120-180 mmhg     Heme/Onc: Polycythemia.   Monitor Hb. Likely related to volume depletion. Improved s/p volume resuscitation.   Anti Xa: 0.35  INR <1.5, Plt>100             DVT ppx: SCD, lovenox    Renal:   likely CSW  salt tabs 1 q 8  bolus prn  Keep eunatremic  Na goal 135- 145  BMPs Q daily    ID:   Afebrile    GI:   Regular diet  Protonix, NG, colace      Social/Family:   Updated at bedside    Discharge planning: ICU, plan for floor in AM    Patient is no longer critically ill    Code Status: [x] Full Code [] DNR [] DNI [] Goals of Care:

## 2021-06-20 NOTE — PROGRESS NOTE ADULT - ASSESSMENT
ASSESSMENT/PLAN: SAH, likely non-aneurysmal, PBD 8  mFS 2, HHS 2  NIHSS 0     Neuro:   Nimodipine  Pain control  PT/OT    Respiratory:   RA  Incentive spirometry prn    CV:   TTE: normal EF  -160 mmHg   Losartan    Endocrine:   Keep sugar 120-180    Heme/Onc:   INR <1.5, Plt>100             DVT ppx: SCD, enoxaparin     Renal:   Keep eunatremic  Na goal 135- 145    ID:   Afebrile    GI:   Regular diet  Bowel regimen     Social/Family:   Awaiting    Patient is no longer critically ill, but is medically complex    Code Status: [x] Full Code [] DNR [] DNI [] Goals of Care:

## 2021-06-20 NOTE — PROGRESS NOTE ADULT - SUBJECTIVE AND OBJECTIVE BOX
Visit Summary: Patient seen and evaluated at bedside.    Overnight Events: none    Exam:  T(C): 37 (06-19-21 @ 19:00), Max: 37 (06-19-21 @ 16:00)  HR: 91 (06-20-21 @ 04:00) (68 - 96)  BP: 154/108 (06-19-21 @ 22:00) (113/53 - 159/103)  RR: 18 (06-20-21 @ 04:00) (12 - 23)  SpO2: 95% (06-20-21 @ 04:00) (94% - 100%)  Wt(kg): --    AOx3, FC, PERRL, EOMI, no facial   5/5 throughout, no drift  SILT  no clonus                          16.1   5.85  )-----------( 213      ( 18 Jun 2021 21:47 )             46.9     06-19    138  |  102  |  16  ----------------------------<  100<H>  4.4   |  22  |  0.78    Ca    9.6      19 Jun 2021 22:41  Phos  3.4     06-19  Mg     2.1     06-19

## 2021-06-21 ENCOUNTER — TRANSCRIPTION ENCOUNTER (OUTPATIENT)
Age: 66
End: 2021-06-21

## 2021-06-21 PROBLEM — Z00.00 ENCOUNTER FOR PREVENTIVE HEALTH EXAMINATION: Status: ACTIVE | Noted: 2021-06-21

## 2021-06-21 PROBLEM — Z78.9 OTHER SPECIFIED HEALTH STATUS: Chronic | Status: ACTIVE | Noted: 2021-06-12

## 2021-06-21 PROCEDURE — 99233 SBSQ HOSP IP/OBS HIGH 50: CPT

## 2021-06-21 PROCEDURE — 99231 SBSQ HOSP IP/OBS SF/LOW 25: CPT | Mod: GC

## 2021-06-21 RX ADMIN — SODIUM CHLORIDE 1 GRAM(S): 9 INJECTION INTRAMUSCULAR; INTRAVENOUS; SUBCUTANEOUS at 06:22

## 2021-06-21 RX ADMIN — POLYETHYLENE GLYCOL 3350 17 GRAM(S): 17 POWDER, FOR SOLUTION ORAL at 18:07

## 2021-06-21 RX ADMIN — NIMODIPINE 60 MILLIGRAM(S): 60 SOLUTION ORAL at 18:07

## 2021-06-21 RX ADMIN — NIMODIPINE 60 MILLIGRAM(S): 60 SOLUTION ORAL at 06:22

## 2021-06-21 RX ADMIN — SENNA PLUS 2 TABLET(S): 8.6 TABLET ORAL at 21:17

## 2021-06-21 RX ADMIN — ENOXAPARIN SODIUM 40 MILLIGRAM(S): 100 INJECTION SUBCUTANEOUS at 18:07

## 2021-06-21 RX ADMIN — LOSARTAN POTASSIUM 50 MILLIGRAM(S): 100 TABLET, FILM COATED ORAL at 06:22

## 2021-06-21 RX ADMIN — NIMODIPINE 60 MILLIGRAM(S): 60 SOLUTION ORAL at 14:59

## 2021-06-21 RX ADMIN — SODIUM CHLORIDE 1 GRAM(S): 9 INJECTION INTRAMUSCULAR; INTRAVENOUS; SUBCUTANEOUS at 21:17

## 2021-06-21 RX ADMIN — NIMODIPINE 60 MILLIGRAM(S): 60 SOLUTION ORAL at 21:18

## 2021-06-21 RX ADMIN — POLYETHYLENE GLYCOL 3350 17 GRAM(S): 17 POWDER, FOR SOLUTION ORAL at 05:43

## 2021-06-21 RX ADMIN — NIMODIPINE 60 MILLIGRAM(S): 60 SOLUTION ORAL at 02:18

## 2021-06-21 RX ADMIN — CHLORHEXIDINE GLUCONATE 1 APPLICATION(S): 213 SOLUTION TOPICAL at 21:17

## 2021-06-21 RX ADMIN — SODIUM CHLORIDE 1 GRAM(S): 9 INJECTION INTRAMUSCULAR; INTRAVENOUS; SUBCUTANEOUS at 14:58

## 2021-06-21 RX ADMIN — NIMODIPINE 60 MILLIGRAM(S): 60 SOLUTION ORAL at 10:09

## 2021-06-21 NOTE — PROGRESS NOTE ADULT - ASSESSMENT
ASSESSMENT/PLAN: SAH, likely non-aneurysmal, PBD 9  mFS 2, HHS 2  NIHSS 0     Neuro:   Nimodipine  Pain control  PT/OT    Respiratory:   RA  Incentive spirometry prn    CV:   TTE: normal EF  -160 mmHg   Losartan    Endocrine:   Keep sugar 120-180    Heme/Onc:   INR <1.5, Plt>100             DVT ppx: SCD, enoxaparin     Renal:   Keep eunatremic  Na goal 135- 145    ID:   Afebrile    GI:   Regular diet  Bowel regimen     Social/Family:   Awaiting    Patient is no longer critically ill, but is medically complex    Code Status: [x] Full Code [] DNR [] DNI [] Goals of Care:

## 2021-06-21 NOTE — PROGRESS NOTE ADULT - ATTENDING COMMENTS
very well appearing.  stable for transfer out of ICU. still very well appearing.  stable for transfer out of ICU today.

## 2021-06-21 NOTE — DISCHARGE NOTE PROVIDER - NSDCCPCAREPLAN_GEN_ALL_CORE_FT
PRINCIPAL DISCHARGE DIAGNOSIS  Diagnosis: Subarachnoid hemorrhage  Assessment and Plan of Treatment: Please follow up with Neurosurgeon as instructed. If you need to reschedule or make an appointment, Please call their office.  Your incision will be evaluated at this appointment. Any sutures or staples may be removed.  Please follow up with your Primary Care Physician as it is important to keep them updated on your health. Please call their office to make appointment.  No strenous activity. No heavy lifting. Do not return to work or operate a motor vehicle until cleared by physician. DO NOT start aspirin / NSAIDS (motrin, advil ...) until cleared by your Neurosurgeon.  You may shower/bath upon discharge home.   You had an arterial puncture on your groin where the procedure was done on 6/18, please monitor for excessive bruising, bleeding, palpable mass under the site and seek medical advice if present.    Please return immediately to the ED for any of the following: fevers, bleeding, swelling, pain not relieved by medication, increased sluggishness or irritability, increased nausea or vomiting, inability to tolerate foods or liquids, increased numbness/tingling/ or inability to move extremities, seizures, chest pain, shortness of breathe.

## 2021-06-21 NOTE — PROGRESS NOTE ADULT - ASSESSMENT
A/P: SAH likely non- aneurysmal CTA is neg. PBD 9  Angio negative x2  mFS 2, HHS 2  NIHSS 0     Neuro:   Initial DSA neg for vascular malformation   Still suspicious for possible underlying aneurysm. Repeat angio 6/18 : No vasc abn  Neurochecks Q4 hr  MRI cervical spine and brain no vascular malformation   Nimodipine  No seizure ppx  TCDs. Currently wnl  Ambulating.     Respiratory:   RA  Incentive spirometry prn    CV: Chest pain  EKG, trops negative  TTE: normal EF  -160 mmhg   Losartan 50mg    Endocrine:   Keep sugar 120-180 mmhg     Heme/Onc: Polycythemia.   Monitor Hb. Likely related to volume depletion. Improved s/p volume resuscitation.   Anti Xa: 0.35  INR <1.5, Plt>100             DVT ppx: SCD, lovenox    Renal:   likely CSW  salt tabs 1 q 8  bolus prn  Keep eunatremic  Na goal 135- 145  BMPs Q daily    ID:   Afebrile    GI:   Regular diet  Protonix, NG, colace      Social/Family:   Updated at bedside    Discharge planning: Floor    Patient is no longer critically ill    Code Status: [x] Full Code [] DNR [] DNI [] Goals of Care:

## 2021-06-21 NOTE — DISCHARGE NOTE PROVIDER - PROVIDER TOKENS
PROVIDER:[TOKEN:[[id number]]] PROVIDER:[TOKEN:[[id number]],SCHEDULEDAPPT:[07/07/2021],SCHEDULEDAPPTTIME:[12:30 PM]]

## 2021-06-21 NOTE — DISCHARGE NOTE PROVIDER - HOSPITAL COURSE
65 year old male with PMHx of HTN (doesn't remember his home meds) presented on the morning of admission with severe HA, neck pain, and palpitations. At 10:30am he had palpitations then at 11:30am while working, he developed nausea, dizziness, and neck pain. Noted to be hypertensive on arrival to the hospital (205/123).   CT head and CTA showed Hyperattenuation in the prepontine cistern, left ambient cistern, and left lateral portion of the suprasellar cistern compatible with subarachnoid hemorrhage. 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  CT angiography brain was negative. Recommended for further angiogram evaluation.  Underwent cerebral angiogram on 6/12 which demonstrated no aneurysmal source of bleeding but demonstrated an occluded DIPIKA.  Patient recovered in NSCU and underwent follow-up angiogram on 6/18/2021 which again, demonstrated no aneurysmal source of bleeding.  Patient underwent MRI neuroaxis with no demonstration of SAH bleed source.  Patient monitored for vasospasm and did not demonstrate any signs or symptoms while in NSCU care.  Patient seen by physical therapy and Occupational therapy, both whom recommend home with no skilled needs.  Patient cleared by neurosurgery and medically stable for discharge.   65 year old male with PMHx of HTN (doesn't remember his home meds) presented on the morning of admission with severe HA, neck pain, and palpitations. At 10:30am he had palpitations then at 11:30am while working, he developed nausea, dizziness, and neck pain. Noted to be hypertensive on arrival to the hospital (205/123).   CT head and CTA showed Hyperattenuation in the prepontine cistern, left ambient cistern, and left lateral portion of the suprasellar cistern compatible with subarachnoid hemorrhage. 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  CT angiography brain was negative. Recommended for further angiogram evaluation.  Underwent cerebral angiogram on 6/12 which demonstrated no aneurysmal source of bleeding but demonstrated an occluded DIPIKA.  Patient recovered in NSCU and underwent follow-up angiogram on 6/18/2021 which again, demonstrated no aneurysmal source of bleeding.  Patient underwent MRI neuroaxis with no demonstration of SAH bleed source.  Patient monitored for vasospasm and did not demonstrate any signs or symptoms while in NSCU care.  Patient seen by physical therapy and Occupational therapy, both whom recommend home with no skilled needs.  Patient cleared by neurosurgery and medically stable for discharge.    36  minutes were spent . More than 50 percent of time was spent on examining patient and reviewing labs and preparing dc paperwork

## 2021-06-21 NOTE — PROGRESS NOTE ADULT - SUBJECTIVE AND OBJECTIVE BOX
SUMMARY:   65 year-old man with history of hypertension developed severe headache, neck pain, and palpitations with work-up revealing prepontine cistern, left ambient cistern, and left lateral portion of the suprasellar cistern subarachnoid hemorrhage.     HOSPITAL COURSE  Angio x 2 negative for aneurysm.     24 HOUR EVENTS:  Unchanged exam.     REVIEW OF SYSTEMS: [] Unable to Assess due to neurologic exam   [x] All ROS addressed below are non-contributory, except:  Neuro: [x] Headache, improved [ ] Back pain [ ] Numbness [ ] Weakness [ ] Ataxia [ ] Dizziness [ ] Aphasia [ ] Dysarthria [ ] Visual disturbance  Resp: [ ] Shortness of breath/dyspnea [ ] Orthopnea [ ] Cough  CV: [ ] Chest pain [ ] Palpitation [ ] Lightheadedness [ ] Syncope  Renal: [ ] Thirst [ ] Edema  GI: [ ] Nausea [ ] Emesis [ ] Abdominal pain [ ] Constipation [ ] Diarrhea  Hem: [ ] Hematemesis [ ] Bright red blood per rectum  ID: [ ] Fever [ ] Chills [ ] Dysuria  ENT: [ ] Rhinorrhea    VITALS/DATA/ORDERS: [x] Reviewed    PHYSICAL EXAM:  General: Cooperative  Neurological: Awake, alert oriented to person, place and time, following commands, PERRLA, EOMI, face symmetric, full strength  Pulmonary: Clear to Auscultation, No Rales, No Rhonchi, No Wheezes   Cardiovascular: S1, S2, Regular Rate and Rhythm   Gastrointestinal: Soft, Nontender, Nondistended   Extremities: No calf tenderness

## 2021-06-21 NOTE — DISCHARGE NOTE PROVIDER - NSDCACTIVITY_GEN_ALL_CORE
Bathing allowed/Do not drive or operate machinery/Showering allowed/Do not make important decisions/Stairs allowed/No heavy lifting/straining

## 2021-06-21 NOTE — DISCHARGE NOTE PROVIDER - NSDCCPTREATMENT_GEN_ALL_CORE_FT
PRINCIPAL PROCEDURE  Procedure: Angiogram, cerebral, unilateral  Findings and Treatment: You had an agiogram x 2 on this admission without any sgnificant findings of aneurysm / source of cause of subarachnoid bleed.  Please follow-up with Dr. Bell as instructed for outpatient follow-up.

## 2021-06-21 NOTE — DISCHARGE NOTE PROVIDER - CARE PROVIDER_API CALL
Jasiel Bell (MD; MS)  Unallocated  [facility], [address] [place] [address]  Phone: [phone]  Fax: [phone]  Follow Up Time:    Jasiel Bell (MD; MS)  Unallocated  [address] [place] [address], [address] [place] [address]  Phone: [phone]  Fax: [phone]  Scheduled Appointment: 07/07/2021 12:30 PM

## 2021-06-21 NOTE — DISCHARGE NOTE PROVIDER - NSDCFUADDINST_GEN_ALL_CORE_FT
-If notice any new weakness, numbness, tingling, severe headache, nausea, vomiting , seizure , blurry vision or double vision then please contact physician and come back to hospital.

## 2021-06-21 NOTE — DISCHARGE NOTE PROVIDER - NSDCMRMEDTOKEN_GEN_ALL_CORE_FT
ascorbic acid:   cyanocobalamin 500 mcg oral tablet: 1 tab(s) orally every other day  losartan 50 mg oral tablet: 1 tab(s) orally once a day  Lutein: for eye  health   acetaminophen 500 mg oral tablet: 2 tab(s) orally every 6 hours, As needed, Temp greater or equal to 38C (100.4F), Mild Pain (1 - 3)  ascorbic acid:   cyanocobalamin 500 mcg oral tablet: 1 tab(s) orally every other day  losartan 50 mg oral tablet: 1 tab(s) orally once a day  Lutein: for eye  health  senna oral tablet: 2 tab(s) orally once a day (at bedtime)

## 2021-06-21 NOTE — PROGRESS NOTE ADULT - ATTENDING COMMENTS
Now PBD10, continues to do well with no signs of spasm on angio from Friday or on TCDs. Will transfer to floor and work on PTOT clearance.

## 2021-06-21 NOTE — PROGRESS NOTE ADULT - SUBJECTIVE AND OBJECTIVE BOX
Patient seen and examined    T(C): 36.7 (06-20-21 @ 19:00), Max: 37.1 (06-20-21 @ 06:00)  HR: 92 (06-20-21 @ 21:00) (70 - 102)  BP: 160/115 (06-20-21 @ 19:00) (117/80 - 160/115)  RR: 19 (06-20-21 @ 21:00) (14 - 20)  SpO2: 95% (06-20-21 @ 21:00) (95% - 100%)    Overnight events:  jessica doing well  Exam:  AOx3, FC, PERRL, EOMI, no facial, 5/5 throughout, no drift

## 2021-06-22 VITALS
OXYGEN SATURATION: 98 % | TEMPERATURE: 98 F | HEART RATE: 74 BPM | RESPIRATION RATE: 18 BRPM | SYSTOLIC BLOOD PRESSURE: 136 MMHG | DIASTOLIC BLOOD PRESSURE: 74 MMHG

## 2021-06-22 PROCEDURE — 86803 HEPATITIS C AB TEST: CPT

## 2021-06-22 PROCEDURE — C1760: CPT

## 2021-06-22 PROCEDURE — 70450 CT HEAD/BRAIN W/O DYE: CPT

## 2021-06-22 PROCEDURE — 87641 MR-STAPH DNA AMP PROBE: CPT

## 2021-06-22 PROCEDURE — 97535 SELF CARE MNGMENT TRAINING: CPT

## 2021-06-22 PROCEDURE — C1894: CPT

## 2021-06-22 PROCEDURE — 86900 BLOOD TYPING SEROLOGIC ABO: CPT

## 2021-06-22 PROCEDURE — 92523 SPEECH SOUND LANG COMPREHEN: CPT

## 2021-06-22 PROCEDURE — 84436 ASSAY OF TOTAL THYROXINE: CPT

## 2021-06-22 PROCEDURE — 80061 LIPID PANEL: CPT

## 2021-06-22 PROCEDURE — 84484 ASSAY OF TROPONIN QUANT: CPT

## 2021-06-22 PROCEDURE — 93306 TTE W/DOPPLER COMPLETE: CPT

## 2021-06-22 PROCEDURE — 99239 HOSP IP/OBS DSCHRG MGMT >30: CPT

## 2021-06-22 PROCEDURE — 80048 BASIC METABOLIC PNL TOTAL CA: CPT

## 2021-06-22 PROCEDURE — 86850 RBC ANTIBODY SCREEN: CPT

## 2021-06-22 PROCEDURE — 76376 3D RENDER W/INTRP POSTPROCES: CPT

## 2021-06-22 PROCEDURE — 83735 ASSAY OF MAGNESIUM: CPT

## 2021-06-22 PROCEDURE — 87635 SARS-COV-2 COVID-19 AMP PRB: CPT

## 2021-06-22 PROCEDURE — 93886 INTRACRANIAL COMPLETE STUDY: CPT

## 2021-06-22 PROCEDURE — 84443 ASSAY THYROID STIM HORMONE: CPT

## 2021-06-22 PROCEDURE — 93005 ELECTROCARDIOGRAM TRACING: CPT

## 2021-06-22 PROCEDURE — 86923 COMPATIBILITY TEST ELECTRIC: CPT

## 2021-06-22 PROCEDURE — 36223 PLACE CATH CAROTID/INOM ART: CPT | Mod: XS

## 2021-06-22 PROCEDURE — 86769 SARS-COV-2 COVID-19 ANTIBODY: CPT

## 2021-06-22 PROCEDURE — 84100 ASSAY OF PHOSPHORUS: CPT

## 2021-06-22 PROCEDURE — 36227 PLACE CATH XTRNL CAROTID: CPT

## 2021-06-22 PROCEDURE — 70553 MRI BRAIN STEM W/O & W/DYE: CPT

## 2021-06-22 PROCEDURE — 97530 THERAPEUTIC ACTIVITIES: CPT

## 2021-06-22 PROCEDURE — 93970 EXTREMITY STUDY: CPT

## 2021-06-22 PROCEDURE — C1769: CPT

## 2021-06-22 PROCEDURE — 84480 ASSAY TRIIODOTHYRONINE (T3): CPT

## 2021-06-22 PROCEDURE — 72156 MRI NECK SPINE W/O & W/DYE: CPT

## 2021-06-22 PROCEDURE — 97165 OT EVAL LOW COMPLEX 30 MIN: CPT

## 2021-06-22 PROCEDURE — C1887: CPT

## 2021-06-22 PROCEDURE — 85520 HEPARIN ASSAY: CPT

## 2021-06-22 PROCEDURE — 85610 PROTHROMBIN TIME: CPT

## 2021-06-22 PROCEDURE — 36226 PLACE CATH VERTEBRAL ART: CPT

## 2021-06-22 PROCEDURE — 97161 PT EVAL LOW COMPLEX 20 MIN: CPT

## 2021-06-22 PROCEDURE — 97116 GAIT TRAINING THERAPY: CPT

## 2021-06-22 PROCEDURE — 85730 THROMBOPLASTIN TIME PARTIAL: CPT

## 2021-06-22 PROCEDURE — 86901 BLOOD TYPING SEROLOGIC RH(D): CPT

## 2021-06-22 PROCEDURE — 36224 PLACE CATH CAROTD ART: CPT

## 2021-06-22 PROCEDURE — 87640 STAPH A DNA AMP PROBE: CPT

## 2021-06-22 PROCEDURE — A9585: CPT

## 2021-06-22 PROCEDURE — 85027 COMPLETE CBC AUTOMATED: CPT

## 2021-06-22 PROCEDURE — 83036 HEMOGLOBIN GLYCOSYLATED A1C: CPT

## 2021-06-22 RX ORDER — SENNA PLUS 8.6 MG/1
2 TABLET ORAL
Qty: 0 | Refills: 0 | DISCHARGE
Start: 2021-06-22

## 2021-06-22 RX ORDER — ACETAMINOPHEN 500 MG
2 TABLET ORAL
Qty: 0 | Refills: 0 | DISCHARGE
Start: 2021-06-22

## 2021-06-22 RX ADMIN — NIMODIPINE 60 MILLIGRAM(S): 60 SOLUTION ORAL at 02:54

## 2021-06-22 RX ADMIN — Medication 1000 MILLIGRAM(S): at 02:49

## 2021-06-22 RX ADMIN — SODIUM CHLORIDE 1 GRAM(S): 9 INJECTION INTRAMUSCULAR; INTRAVENOUS; SUBCUTANEOUS at 05:12

## 2021-06-22 RX ADMIN — Medication 1000 MILLIGRAM(S): at 01:33

## 2021-06-22 RX ADMIN — NIMODIPINE 60 MILLIGRAM(S): 60 SOLUTION ORAL at 05:12

## 2021-06-22 RX ADMIN — NIMODIPINE 60 MILLIGRAM(S): 60 SOLUTION ORAL at 10:35

## 2021-06-22 RX ADMIN — POLYETHYLENE GLYCOL 3350 17 GRAM(S): 17 POWDER, FOR SOLUTION ORAL at 05:14

## 2021-06-22 RX ADMIN — LOSARTAN POTASSIUM 50 MILLIGRAM(S): 100 TABLET, FILM COATED ORAL at 05:14

## 2021-06-22 NOTE — PROGRESS NOTE ADULT - PROVIDER SPECIALTY LIST ADULT
NSICU
Neurosurgery
Neurosurgery
NSICU
Neurosurgery
NSICU

## 2021-06-22 NOTE — PROGRESS NOTE ADULT - ASSESSMENT
HPI:  65 year old male with PMHx of HTN (doesn't remember his home meds) presented on the morning od admission with severe HA, neck pain, and palpitations. At 10:30am he had palps then at 11:30am while working, he developed nausea, dizziness, and neck pain. Noted to be hypertensive on arrival to the hospital (205/123).   CT head and CTA showed Hyperattenuation in the prepontine cistern, left ambient cistern, and left lateral portion of the suprasellar cistern compatible with subarachnoid hemorrhage. 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  CT angiography brain was negative. Recommended for further angiogram evaluation.       89 yo male with HH2 MF 2 SAH        (12 Jun 2021 16:42)    PROCEDURE:   S/P cerebral angiogram on 6/12 and 6/18 -neg    POD#    PLAN:  1 Out of bed   2 walk   3 prn pain meds   4 nimodipine for SAH protocol to prevent spasm   5 hyponatremia -stable on salt tab   6 +void   7 last bm 6/20   8 dc home today     -Will discuss with Dr. Bell   -spectra [id number]

## 2021-06-22 NOTE — PROGRESS NOTE ADULT - NSICDXPILOT_GEN_ALL_CORE
[place]

## 2021-06-22 NOTE — PROGRESS NOTE ADULT - SUBJECTIVE AND OBJECTIVE BOX
SUBJECTIVE:   Patient was seen and evaluated at bedside. Patient is resting in bed and is in no new acute distress.   OVERNIGHT EVENTS:   none   Vital Signs Last 24 Hrs  T(C): 36.6 (21 Jun 2021 19:00), Max: 36.7 (21 Jun 2021 11:00)  T(F): 97.9 (21 Jun 2021 19:00), Max: 98 (21 Jun 2021 11:00)  HR: 82 (22 Jun 2021 05:00) (70 - 100)  BP: 136/88 (22 Jun 2021 05:00) (114/84 - 152/106)  BP(mean): 112 (21 Jun 2021 21:18) (91 - 112)  RR: 18 (22 Jun 2021 05:00) (18 - 24)  SpO2: 98% (22 Jun 2021 05:00) (98% - 100%)    PHYSICAL EXAM:    General: No Acute Distress     Neurological: Awake, alert oriented to person, place and time, Following Commands, PERRL, EOMI, Face Symmetrical, Speech Fluent, Moving all extremities, Muscle Strength normal in all four extremities, No Drift, Sensation to Light Touch Intact    Pulmonary: Clear to Auscultation, No Rales, No Rhonchi, No Wheezes     Cardiovascular: S1, S2, Regular Rate and Rhythm     Gastrointestinal: Soft, Nontender, Nondistended     Incision:     LABS:   06-20    135  |  101  |  18  ----------------------------<  106<H>  3.9   |  21<L>  |  0.86    Ca    9.4      20 Jun 2021 21:12  Phos  2.5     06-20  Mg     2.0     06-20 06-21 @ 07:01  -  06-22 @ 07:00  --------------------------------------------------------  IN: 840 mL / OUT: 1100 mL / NET: -260 mL      DRAINS:     MEDICATIONS:  Antibiotics:    Neuro:  acetaminophen   Tablet .. 1000 milliGRAM(s) Oral every 6 hours PRN Temp greater or equal to 38C (100.4F), Mild Pain (1 - 3)    Cardiac:  losartan 50 milliGRAM(s) Oral daily  niMODipine 60 milliGRAM(s) Oral every 4 hours    Pulm:    GI/:  polyethylene glycol 3350 17 Gram(s) Oral two times a day  senna 2 Tablet(s) Oral at bedtime    Other:   enoxaparin Injectable 40 milliGRAM(s) SubCutaneous <User Schedule>  sodium chloride 1 Gram(s) Oral every 8 hours    DIET: [] Regular [] CCD [] Renal [] Puree [] Dysphagia [] Tube Feeds:   regular   IMAGING: no new imaging today

## 2021-06-22 NOTE — PROGRESS NOTE ADULT - THIS PATIENT HAS THE FOLLOWING CONDITION(S)/DIAGNOSES ON THIS ADMISSION:
Patient would like the provider's phone number so she can call herself.  Gave patient Dr. Pearce phone number.    
sah
subarachnoid hemorrhage
sah
None
sah
sah
None
sah
sah
None
None
sah
subarachnoid hemorrhage
sah
sah

## 2021-07-08 ENCOUNTER — APPOINTMENT (OUTPATIENT)
Dept: NEUROSURGERY | Facility: CLINIC | Age: 66
End: 2021-07-08
Payer: MEDICARE

## 2021-07-08 VITALS
WEIGHT: 168 LBS | TEMPERATURE: 98.1 F | HEIGHT: 69 IN | HEART RATE: 90 BPM | RESPIRATION RATE: 18 BRPM | OXYGEN SATURATION: 98 % | BODY MASS INDEX: 24.88 KG/M2

## 2021-07-08 VITALS — SYSTOLIC BLOOD PRESSURE: 195 MMHG | DIASTOLIC BLOOD PRESSURE: 129 MMHG

## 2021-07-08 DIAGNOSIS — Z80.1 FAMILY HISTORY OF MALIGNANT NEOPLASM OF TRACHEA, BRONCHUS AND LUNG: ICD-10-CM

## 2021-07-08 DIAGNOSIS — I65.21 OCCLUSION AND STENOSIS OF RIGHT CAROTID ARTERY: ICD-10-CM

## 2021-07-08 PROCEDURE — 99212 OFFICE O/P EST SF 10 MIN: CPT

## 2021-07-08 RX ORDER — LOSARTAN POTASSIUM 50 MG/1
50 TABLET, FILM COATED ORAL
Refills: 0 | Status: ACTIVE | COMMUNITY

## 2021-07-09 NOTE — HISTORY OF PRESENT ILLNESS
[FreeTextEntry1] : Hospital Course: \par Discharge Date	22-Jun-2021 \par Admission Date	12-Jun-2021 16:38 \par Reason for Admission	SAH, Headaches. \par 	 \par 65 year old male with PMHx of HTN (doesn't remember his home meds) presented on the morning of admission with severe HA, neck pain, and palpitations. At 10:30am he had palpitations then at 11:30am while working, he developed nausea, dizziness, and neck pain. Noted to be hypertensive on arrival to the hospital \par (205/123). CT head and CTA showed Hyperattenuation in the prepontine cistern, left ambient cistern, and left lateral portion of the suprasellar cistern compatible with subarachnoid hemorrhage. 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  CT angiography brain was negative. Recommended for further angiogram evaluation.  Underwent cerebral angiogram on 6/12 which demonstrated no aneurysmal source of bleeding but demonstrated an occluded DIPIKA.  Patient recovered in NSCU and underwent follow-up angiogram on 6/18/2021 which again, demonstrated no aneurysmal source of bleeding.  Patient underwent MRI neuroaxis with no demonstration of SAH bleed source.  \par \par Today he presents for follow up visit without any specific complaints.  He states that he has occasional mild headaches and body aches that are relieved with Tylenol prn.  He walks around the block as his PT sessions.  \par He denies weakness in extremities, speech impairment, vision problems.\par \par \par DR. RALPH BAGLEY\par [address] [place] [address]\par [place] [address]\par TEL - [phone]\par FAX -  [phone]

## 2021-07-09 NOTE — ASSESSMENT
[FreeTextEntry1] : 65M with h/o HTN presented with acute SAH on 6/12/21, CTA and angiogram x2 negative for aneurysm or vascular malformation. 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 \par \par IMPRESSION:\par 1. Chronic DIPIKA occlusion.\par 2. Spontaneous SAH.\par \par \par \par PLAN:\par 1. MRI and MRA brain w/wo AUGUST 2021\par 2. F/U with PCP regarding elevated /129, HR 91.  Pt states that he feels fine and would like to leave without going to the ER.  He will also get a BP machine and call his doctor to have a follow today if possible.

## 2021-07-09 NOTE — PHYSICAL EXAM
[General Appearance - Alert] : alert [General Appearance - In No Acute Distress] : in no acute distress [General Appearance - Well Nourished] : well nourished [General Appearance - Well Developed] : well developed [General Appearance - Well-Appearing] : healthy appearing [Oriented To Time, Place, And Person] : oriented to person, place, and time [Impaired Insight] : insight and judgment were intact [Affect] : the affect was normal [Memory Recent] : recent memory was not impaired [Person] : oriented to person [Place] : oriented to place [Time] : oriented to time [Cranial Nerves Optic (II)] : visual acuity intact bilaterally,  pupils equal round and reactive to light [Cranial Nerves Oculomotor (III)] : extraocular motion intact [Cranial Nerves Trigeminal (V)] : facial sensation intact symmetrically [Cranial Nerves Facial (VII)] : face symmetrical [Cranial Nerves Vestibulocochlear (VIII)] : hearing was intact bilaterally [Cranial Nerves Glossopharyngeal (IX)] : tongue and palate midline [Cranial Nerves Accessory (XI - Cranial And Spinal)] : head turning and shoulder shrug symmetric [Cranial Nerves Hypoglossal (XII)] : there was no tongue deviation with protrusion [Motor Strength] : muscle strength was normal in all four extremities [Motor Handedness Right-Handed] : the patient is right hand dominant [Balance] : balance was intact [Sclera] : the sclera and conjunctiva were normal [PERRL With Normal Accommodation] : pupils were equal in size, round, reactive to light, with normal accommodation [Extraocular Movements] : extraocular movements were intact [Outer Ear] : the ears and nose were normal in appearance [Hearing Threshold Finger Rub Not [place]ascade] : hearing was normal [Oropharynx] : the oropharynx was normal [Neck Appearance] : the appearance of the neck was normal [Respiration, Rhythm And Depth] : normal respiratory rhythm and effort [Exaggerated Use Of Accessory Muscles For Inspiration] : no accessory muscle use [Heart Rate And Rhythm] : heart rate was normal and rhythm regular [Abnormal Walk] : normal gait [Involuntary Movements] : no involuntary movements were seen [Motor Tone] : muscle strength and tone were normal [Skin Color & Pigmentation] : normal skin color and pigmentation [] : no rash [Limited Balance] : balance was intact

## 2021-08-02 ENCOUNTER — RESULT REVIEW (OUTPATIENT)
Age: 66
End: 2021-08-02

## 2021-08-07 ENCOUNTER — APPOINTMENT (OUTPATIENT)
Dept: MRI IMAGING | Facility: IMAGING CENTER | Age: 66
End: 2021-08-07
Payer: MEDICARE

## 2021-08-07 ENCOUNTER — OUTPATIENT (OUTPATIENT)
Dept: OUTPATIENT SERVICES | Facility: HOSPITAL | Age: 66
LOS: 1 days | End: 2021-08-07
Payer: COMMERCIAL

## 2021-08-07 DIAGNOSIS — I60.9 NONTRAUMATIC SUBARACHNOID HEMORRHAGE, UNSPECIFIED: ICD-10-CM

## 2021-08-07 PROCEDURE — 70546 MR ANGIOGRAPH HEAD W/O&W/DYE: CPT | Mod: 26,59

## 2021-08-07 PROCEDURE — A9585: CPT

## 2021-08-07 PROCEDURE — 70546 MR ANGIOGRAPH HEAD W/O&W/DYE: CPT

## 2021-08-07 PROCEDURE — 70553 MRI BRAIN STEM W/O & W/DYE: CPT | Mod: 26

## 2021-08-07 PROCEDURE — 70553 MRI BRAIN STEM W/O & W/DYE: CPT

## 2021-08-19 ENCOUNTER — APPOINTMENT (OUTPATIENT)
Dept: NEUROSURGERY | Facility: CLINIC | Age: 66
End: 2021-08-19
Payer: MEDICARE

## 2021-08-19 VITALS
TEMPERATURE: 97 F | HEIGHT: 69 IN | SYSTOLIC BLOOD PRESSURE: 153 MMHG | BODY MASS INDEX: 24.88 KG/M2 | HEART RATE: 103 BPM | OXYGEN SATURATION: 98 % | WEIGHT: 168 LBS | DIASTOLIC BLOOD PRESSURE: 113 MMHG

## 2021-08-19 VITALS — SYSTOLIC BLOOD PRESSURE: 153 MMHG | DIASTOLIC BLOOD PRESSURE: 103 MMHG

## 2021-08-19 PROCEDURE — 99212 OFFICE O/P EST SF 10 MIN: CPT

## 2021-08-19 NOTE — HISTORY OF PRESENT ILLNESS
[FreeTextEntry1] : 65 year old male with PMHx of HTN (doesn't remember his home meds) presented on the morning of admission with severe HA, neck pain, and palpitations. At 10:30am he had palpitations then at 11:30am while working, he developed nausea, dizziness, and neck pain. Noted to be hypertensive on arrival to the hospital \par (205/123). CT head and CTA showed Hyperattenuation in the prepontine cistern, left ambient cistern, and left lateral portion of the suprasellar cistern compatible with subarachnoid hemorrhage. 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 CT angiography brain was negative. Recommended for further angiogram evaluation. Underwent cerebral angiogram on 6/12 which demonstrated no aneurysmal source of bleeding but demonstrated an occluded DIPIKA. Patient recovered in NSCU and underwent follow-up angiogram on 6/18/2021 which again, demonstrated no aneurysmal source of bleeding. Patient underwent MRI neuroaxis with no demonstration of SAH bleed source.

## 2021-08-19 NOTE — REASON FOR VISIT
[Follow-Up: _____] : a [unfilled] follow-up visit [FreeTextEntry1] : Lidia Avila is a 65yr old male here for a follow up visit after having recent MR imaging done. Today he presents feeling well. Denies headaches, no motor sensory speech visual abnormalities.

## 2021-08-19 NOTE — ASSESSMENT
[FreeTextEntry1] : 65M with h/o HTN presented with acute SAH on 6/12/21, CTA and angiogram x2 negative for aneurysm or vascular malformation. 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 \par \par IMPRESSION:\par 1. Chronic DIPIKA occlusion.\par 2. Spontaneous SAH.\par 3. New MRI MRA MRV demonstrates no underlying vascular abnormality as source of the subarachnoid hemorrhage, prior hemorrhage now resolved.\par \par Patient feeling well today neurologically intact\par \par \par PLAN:\par - Recommend final cerebral angiogram in the next 1-2 weeks. Risks and benefits of angiography discussed with patient in great detail and he wishes to proceed. \par - If angio negative, will follow with MRI/A with [facility] in 6 months, then annually

## 2021-08-19 NOTE — RESULTS/DATA
[FreeTextEntry1] : EXAM: MR BRAIN WAW IC\par \par EXAM: MR ANGIO BRAIN WAW IC\par \par EXAM: MR VENOGRAM BRAIN WAW IC\par \par \par PROCEDURE DATE: 08/07/2021\par \par \par \par INTERPRETATION: CLINICAL INDICATION: The proximal end hemorrhage, dizziness. Evaluate for vascular lesion. Evaluate for dural venous sinus thrombosis.\par \par TECHNIQUE: Multi-planar multi-sequential MR imaging of the brain was performed before and after the intravenous administration of 7.5 ml of Gadavist. MRA of the head was performed before and after the intravenous administration of 7.5 ml of Gadavist. Magnetic resonance venogram of the intracranial vessels was performed before and after the intravenous administration of 7.5 ml of Gadavist.\par \par \par COMPARISON: CT head, CT angiogram of the head and neck 6/12/2021. MRI brain 6/13/2021. Intracranial angiography 6/18/2021. CT head 6/19/2021.\par \par FINDINGS:\par \par MRI BRAIN:\par \par There is suggestion of minimal residual subarachnoid hemorrhage in the prepontine cistern.\par \par There are scattered T2/FLAIR hyperintense foci in the subcortical and periventricular white matter, nonspecific finding, but most likely representing sequelae of mild chronic microvascular ischemic disease.\par \par There is cortical sulcal prominence related to underlying mild brain parenchymal volume loss.\par \par No acute infarction, intraparenchymal hemorrhage or mass. No abnormal intracranial enhancement is identified.\par \par The ventricles are normal without evidence of hydrocephalus. There are no extra-axial fluid collections.\par \par The patient is status post left lens replacement. The imaged portions of the paranasal sinuses are aerated. The mastoid air cells are clear. The visualized soft tissues and osseous structures appear unremarkable.\par \par \par MRA HEAD:\par \par Evaluation of the anterior circulation demonstrates occlusion of the intracranial right internal carotid artery, with reconstitution of flow in the distal right supraclinoid segment via collateral flow from the [place] contralateral side, findings better visualized on conventional intracranial angiography 6/18/2021. There is mild to moderate narrowing of the distal left cavernous/paraclinoid internal carotid artery. The proximal anterior, middle and posterior cerebral arteries are patent bilaterally.\par \par Evaluation of the posterior circulation demonstrates patent vertebrobasilar system. There is a hypoplastic left intradural vertebral artery, which likely represents developmental variant versus sequelae of atherosclerotic disease.\par \par No evidence of aneurysm or vascular malformation in the [place].\par \par \par MR VENOGRAM:\par \par Evaluation of the venous system demonstrates the major dural venous sinuses including the superior sagittal sinus, inferior sagittal sinus, straight sinus, transverse sinuses and sigmoid sinuses to be patent.\par \par The deep cerebral veins including the bilateral internal cerebral veins, the basal veins of [place], the vein of [place] and internal jugular veins appear patent.\par \par \par IMPRESSION:\par \par MRI BRAIN: Minimal residual subarachnoid hemorrhage in the prepontine cistern. Mild chronic microvascular ischemic disease. No abnormal intracranial enhancement.\par \par MRA HEAD: Occluded intracranial right ICA, with reconstitution of flow in the distal right supraclinoid segment. Mild to moderate narrowing distal left cavernous/paraclinoid ICA. No evidence of aneurysm or vascular malformation.\par \par MR VENOGRAM: No evidence of dural venous sinus or deep cerebral venous thrombosis.\par \par \par \par \par --- End of Report ---\par \par \par \par

## 2021-08-20 ENCOUNTER — NON-APPOINTMENT (OUTPATIENT)
Age: 66
End: 2021-08-20

## 2021-08-23 ENCOUNTER — NON-APPOINTMENT (OUTPATIENT)
Age: 66
End: 2021-08-23

## 2021-08-24 ENCOUNTER — NON-APPOINTMENT (OUTPATIENT)
Age: 66
End: 2021-08-24

## 2021-08-24 ENCOUNTER — APPOINTMENT (OUTPATIENT)
Dept: CARDIOLOGY | Facility: CLINIC | Age: 66
End: 2021-08-24
Payer: MEDICARE

## 2021-08-24 VITALS
BODY MASS INDEX: 25.18 KG/M2 | RESPIRATION RATE: 14 BRPM | HEART RATE: 87 BPM | OXYGEN SATURATION: 97 % | WEIGHT: 170 LBS | HEIGHT: 69 IN | DIASTOLIC BLOOD PRESSURE: 82 MMHG | SYSTOLIC BLOOD PRESSURE: 140 MMHG

## 2021-08-24 DIAGNOSIS — I10 ESSENTIAL (PRIMARY) HYPERTENSION: ICD-10-CM

## 2021-08-24 PROCEDURE — 93000 ELECTROCARDIOGRAM COMPLETE: CPT

## 2021-08-24 PROCEDURE — 99215 OFFICE O/P EST HI 40 MIN: CPT

## 2021-08-24 PROCEDURE — 99205 OFFICE O/P NEW HI 60 MIN: CPT

## 2021-08-24 RX ORDER — AZILSARTAN KAMEDOXOMIL 40 MG/1
40 TABLET ORAL
Refills: 0 | Status: ACTIVE | COMMUNITY
Start: 2021-08-24

## 2021-09-09 ENCOUNTER — TRANSCRIPTION ENCOUNTER (OUTPATIENT)
Age: 66
End: 2021-09-09

## 2021-09-28 ENCOUNTER — APPOINTMENT (OUTPATIENT)
Dept: ULTRASOUND IMAGING | Facility: IMAGING CENTER | Age: 66
End: 2021-09-28
Payer: MEDICARE

## 2021-09-28 ENCOUNTER — APPOINTMENT (OUTPATIENT)
Dept: CARDIOLOGY | Facility: CLINIC | Age: 66
End: 2021-09-28
Payer: MEDICARE

## 2021-09-28 ENCOUNTER — OUTPATIENT (OUTPATIENT)
Dept: OUTPATIENT SERVICES | Facility: HOSPITAL | Age: 66
LOS: 1 days | End: 2021-09-28
Payer: COMMERCIAL

## 2021-09-28 VITALS
BODY MASS INDEX: 24.88 KG/M2 | OXYGEN SATURATION: 97 % | HEART RATE: 73 BPM | SYSTOLIC BLOOD PRESSURE: 134 MMHG | HEIGHT: 69 IN | WEIGHT: 168 LBS | DIASTOLIC BLOOD PRESSURE: 89 MMHG

## 2021-09-28 DIAGNOSIS — Z87.891 PERSONAL HISTORY OF NICOTINE DEPENDENCE: ICD-10-CM

## 2021-09-28 DIAGNOSIS — Z80.8 FAMILY HISTORY OF MALIGNANT NEOPLASM OF OTHER ORGANS OR SYSTEMS: ICD-10-CM

## 2021-09-28 DIAGNOSIS — E78.5 HYPERLIPIDEMIA, UNSPECIFIED: ICD-10-CM

## 2021-09-28 DIAGNOSIS — Z86.79 PERSONAL HISTORY OF OTHER DISEASES OF THE CIRCULATORY SYSTEM: ICD-10-CM

## 2021-09-28 DIAGNOSIS — I10 ESSENTIAL (PRIMARY) HYPERTENSION: ICD-10-CM

## 2021-09-28 DIAGNOSIS — I65.29 OCCLUSION AND STENOSIS OF UNSPECIFIED CAROTID ARTERY: ICD-10-CM

## 2021-09-28 PROCEDURE — 93880 EXTRACRANIAL BILAT STUDY: CPT | Mod: 26

## 2021-09-28 PROCEDURE — 93978 VASCULAR STUDY: CPT | Mod: 26

## 2021-09-28 PROCEDURE — 93000 ELECTROCARDIOGRAM COMPLETE: CPT

## 2021-09-28 PROCEDURE — 93880 EXTRACRANIAL BILAT STUDY: CPT

## 2021-09-28 PROCEDURE — 93978 VASCULAR STUDY: CPT

## 2021-09-28 PROCEDURE — 99214 OFFICE O/P EST MOD 30 MIN: CPT

## 2021-09-28 RX ORDER — ROSUVASTATIN CALCIUM 5 MG/1
5 TABLET, FILM COATED ORAL
Qty: 30 | Refills: 2 | Status: ACTIVE | COMMUNITY
Start: 2021-09-28 | End: 1900-01-01

## 2021-09-30 NOTE — ASSESSMENT
[FreeTextEntry1] : Assessment:\par 1. R ICA occlusion with distal reconstitution on CT.\par 2. Ex-smoker\par 3. History of subarachnoid hemorrhage\par 4. HTN, controlled\par 5. HLD, LDL above 130\par \par Plan:\par 1. Did not have carotid duplex, had CT, MR and angiogram.\par     Plan for carotid artery duplex.\par 2. Not on Statin, medical management of total R ICA occlusion recommended.\par     Recommend to start Crestor 5 mg daily. Repeat Lipid panel and LFTs in 3 months.\par 3. Due to age and smoking history, surveillance abdominal aorta duplex. \par 4. BP well controlled. Continue follow-up and excellent care with Dr. Patrick.\par 5. Continue follow-up and excellent care with Dr. Bell. \par 6. Will call with test results. Call w/ questions.

## 2021-09-30 NOTE — REASON FOR VISIT
[Initial Evaluation] : an initial evaluation of [FreeTextEntry1] : 9/28/21\par \par 67 y/o M Ex-Smoker w/ PMHX HTN who was admitted in summer 2021 with headache and palpitations, noted to have HTN, noted to have subarachnoid hemorrhage. CTA neck showed R ICA occlusion with distal reconstitution. CT angiography brain was negative.  Underwent cerebral angiogram on 6/12 which demonstrated no aneurysmal source of bleeding but demonstrated an occluded DIPIKA.  Patient recovered in NSCU and underwent follow-up angiogram on 6/18/2021 which again, demonstrated no aneurysmal source of bleeding.  Patient underwent MRI  with no demonstration of SAH bleed source.  \par \par No C/P or SOB. No vision changes. No gait instability. No extremity weakness. No speech or swallow difficulty. No h/o CVA. History of heavy lifting.  in June. Not on Statin. \par \par Medications:\par Amlodipine 5 mg daily\par Edarbi 40 mg daily\par \par \par Cardiologist Dr. Patrick.\par

## 2021-09-30 NOTE — PHYSICAL EXAM
[Respiration, Rhythm And Depth] : normal respiratory rhythm and effort [Auscultation Breath Sounds / Voice Sounds] : lungs were clear to auscultation bilaterally [Heart Rate And Rhythm] : heart rate and rhythm were normal [Heart Sounds] : normal S1 and S2 [Murmurs] : no murmurs present [Bowel Sounds] : normal bowel sounds [Abdomen Soft] : soft [Abdomen Tenderness] : non-tender [Abnormal Walk] : normal gait [Cyanosis, Localized] : no localized cyanosis [] : no rash [No Skin Ulcers] : no skin ulcer [Oriented To Time, Place, And Person] : oriented to person, place, and time [FreeTextEntry1] : No LE edema. Palpable distal pulses

## 2021-10-04 ENCOUNTER — NON-APPOINTMENT (OUTPATIENT)
Age: 66
End: 2021-10-04

## 2021-10-05 ENCOUNTER — APPOINTMENT (OUTPATIENT)
Dept: NEUROLOGY | Facility: CLINIC | Age: 66
End: 2021-10-05
Payer: MEDICARE

## 2021-10-05 VITALS
HEIGHT: 69 IN | DIASTOLIC BLOOD PRESSURE: 92 MMHG | WEIGHT: 168 LBS | BODY MASS INDEX: 24.88 KG/M2 | HEART RATE: 66 BPM | SYSTOLIC BLOOD PRESSURE: 136 MMHG

## 2021-10-05 PROCEDURE — 93888 INTRACRANIAL LIMITED STUDY: CPT

## 2021-10-05 PROCEDURE — 99205 OFFICE O/P NEW HI 60 MIN: CPT

## 2021-10-05 PROCEDURE — 93880 EXTRACRANIAL BILAT STUDY: CPT

## 2021-10-05 RX ORDER — KRILL/OM-3/DHA/EPA/PHOSPHO/AST 1000-230MG
81 CAPSULE ORAL
Qty: 90 | Refills: 3 | Status: ACTIVE | COMMUNITY
Start: 2021-10-05 | End: 1900-01-01

## 2021-10-05 NOTE — PHYSICAL EXAM

## 2021-10-05 NOTE — HISTORY OF PRESENT ILLNESS
[FreeTextEntry1] : Mr. Avila is a 66 year old man presented to [facility] on 06/12/21 with severe headache and neck pain, found to have subarachnoid hemorrhage in the prepontine cistern and left ambient and suprasellar cisterns. 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 He had 2 cerebral angiograms, both negative for any source of hemorrhage. Referred by Dr. Patrick to discuss chronic carotid occlusion. He had a carotid Doppler done today. All neuroimaging reviewed personally by me. 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 He denies any history of stroke/TIA events.

## 2021-10-05 NOTE — DISCUSSION/SUMMARY
[FreeTextEntry1] : Mr. Avila is a 66 year old man now 4 months s/p SAH, cerebral angiogram x 2 negative for any source and found to have an incidental asymptomatic right ICA occlusion and a moderate cavernous left ICA stenosis. 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 Discussed with Dr. Bell who is in agreement with starting patient on ASA 81 mg for stroke prevention given the right carotid occlusion and left intracranial stenosis. Plan for MRA Head with NOVA to evaluate flow. Carotid Doppler done today showed chronic right ICA occlusion and incidental thyroid nodule, spoke to daughter to follow with PCP. I will see him again in 4-6 weeks. All of his questions and concerns were addressed.

## 2021-10-05 NOTE — REVIEW OF SYSTEMS
[Fever] : no fever [Chills] : no chills [Feeling Poorly] : not feeling poorly [Feeling Tired] : not feeling tired [Suicidal] : not suicidal [Anxiety] : no anxiety [Depression] : no depression [Confused or Disoriented] : no confusion [Memory Lapses or Loss] : no memory loss [Decr. Concentrating Ability] : no decrease in concentrating ability [Difficulty with Language] : no ~M difficulty with language [Changed Thought Patterns] : no change in thought patterns [Repeating Questions] : no repeated questioning about recent events [Facial Weakness] : no facial weakness [Arm Weakness] : no arm weakness [Hand Weakness] : no hand weakness [Leg Weakness] : no leg weakness [Poor Coordination] : good coordination [Difficulty Writing] : no difficulty writing [Difficulties in Speech] : no speech difficulties [Numbness] : no numbness [Tingling] : no tingling [Seizures] : no convulsions [Dizziness] : no dizziness [Fainting] : no fainting [Lightheadedness] : no lightheadedness [Vertigo] : no vertigo [Tension Headache] : no tension-type headache [Difficulty Walking] : no difficulty walking [Inability to Walk] : able to walk [Ataxia] : no ataxia [Eyesight Problems] : no eyesight problems [Loss Of Hearing] : no hearing loss [Chest Pain] : no chest pain [Palpitations] : no palpitations [Shortness Of Breath] : no shortness of breath [Wheezing] : no wheezing [Vomiting] : no vomiting [Incontinence] : no incontinence [Joint Pain] : no joint pain [Easy Bleeding] : no tendency for easy bleeding [Easy Bruising] : no tendency for easy bruising

## 2021-10-28 ENCOUNTER — NON-APPOINTMENT (OUTPATIENT)
Age: 66
End: 2021-10-28

## 2021-11-01 ENCOUNTER — APPOINTMENT (OUTPATIENT)
Dept: OPHTHALMOLOGY | Facility: CLINIC | Age: 66
End: 2021-11-01
Payer: MEDICARE

## 2021-11-01 ENCOUNTER — NON-APPOINTMENT (OUTPATIENT)
Age: 66
End: 2021-11-01

## 2021-11-01 PROCEDURE — 92004 COMPRE OPH EXAM NEW PT 1/>: CPT

## 2021-11-01 PROCEDURE — 92134 CPTRZ OPH DX IMG PST SGM RTA: CPT

## 2021-11-02 ENCOUNTER — APPOINTMENT (OUTPATIENT)
Dept: NEUROLOGY | Facility: CLINIC | Age: 66
End: 2021-11-02
Payer: MEDICARE

## 2021-11-02 VITALS
HEART RATE: 71 BPM | WEIGHT: 168 LBS | HEIGHT: 69 IN | DIASTOLIC BLOOD PRESSURE: 98 MMHG | BODY MASS INDEX: 24.88 KG/M2 | SYSTOLIC BLOOD PRESSURE: 150 MMHG

## 2021-11-02 PROCEDURE — 99215 OFFICE O/P EST HI 40 MIN: CPT

## 2021-11-02 NOTE — PHYSICAL EXAM
[General Appearance - Alert] : alert [General Appearance - Well Nourished] : well nourished [General Appearance - Well Developed] : well developed [Oriented To Time, Place, And Person] : oriented to person, place, and time [Affect] : the affect was normal [Mood] : the mood was normal [Person] : oriented to person [Place] : oriented to place [Time] : oriented to time [Short Term Intact] : short term memory intact [Concentration Intact] : normal concentrating ability [Visual Intact] : visual attention was ~T not ~L decreased [Naming Objects] : no difficulty naming common objects [Repeating Phrases] : no difficulty repeating a phrase [Writing A Sentence] : no difficulty writing a sentence [Fluency] : fluency intact [Comprehension] : comprehension intact [Reading] : reading intact [Past History] : adequate knowledge of personal past history [Cranial Nerves Optic (II)] : visual acuity intact bilaterally,  visual fields full to confrontation, pupils equal round and reactive to light [Cranial Nerves Oculomotor (III)] : extraocular motion intact [Cranial Nerves Trigeminal (V)] : facial sensation intact symmetrically [Cranial Nerves Facial (VII)] : face symmetrical [Cranial Nerves Vestibulocochlear (VIII)] : hearing was intact bilaterally [Cranial Nerves Glossopharyngeal (IX)] : tongue and palate midline [Cranial Nerves Accessory (XI - Cranial And Spinal)] : head turning and shoulder shrug symmetric [Cranial Nerves Hypoglossal (XII)] : there was no tongue deviation with protrusion [Motor Tone] : muscle tone was normal in all four extremities [Motor Strength] : muscle strength was normal in all four extremities [Motor Handedness Right-Handed] : the patient is right hand dominant [Sensation Tactile Decrease] : light touch was intact [Balance] : balance was intact [Full Visual Field] : full visual field [Hearing Threshold Finger Rub Not [place]] : hearing was normal [Neck Appearance] : the appearance of the neck was normal [] : no respiratory distress [Heart Rate And Rhythm] : heart rate was normal and rhythm regular [Edema] : there was no peripheral edema [Abdomen Soft] : soft [Abnormal Walk] : normal gait [Skin Color & Pigmentation] : normal skin color and pigmentation [Paresis Pronator Drift Right-Sided] : no pronator drift on the right [Paresis Pronator Drift Left-Sided] : no pronator drift on the left [Motor Strength Upper Extremities Bilaterally] : strength was normal in both upper extremities [Motor Strength Lower Extremities Bilaterally] : strength was normal in both lower extremities [Dysdiadochokinesia Bilaterally] : not present [Coordination - Dysmetria Impaired Finger-to-Nose Bilateral] : not present

## 2021-11-02 NOTE — DISCUSSION/SUMMARY
[FreeTextEntry1] : Mr. Avila is a 66 year old man now 5 months s/p SAH, cerebral angiogram x 2 negative for any source and found to have an incidental asymptomatic right ICA occlusion and a moderate cavernous left ICA stenosis. 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 We discussed again with the patient that he should start ASA 81 mg for stroke prevention given the right carotid occlusion and left carotid stenosis. Plan for MRA Head with NOVA to evaluate flow. I will see him again in 3- 4 weeks, after this imaging is completed. All of his questions and concerns were addressed.

## 2021-11-02 NOTE — CONSULT LETTER
[Dear  ___] : Dear  [unfilled], [Consult Letter:] : I had the pleasure of evaluating your patient, [unfilled]. [( Thank you for referring [unfilled] for consultation for _____ )] : Thank you for referring [unfilled] for consultation for [unfilled] [Consult Closing:] : Thank you very much for allowing me to participate in the care of this patient.  If you have any questions, please do not hesitate to contact me. [Please see my note below.] : Please see my note below. [Sincerely,] : Sincerely, [FreeTextEntry3] : Brandon Conteh MD\par Chief, Vascular Neurology and Neurology Service , NeuroEndovascular Surgery\par  of Neurology and Radiology\par [facility]\par Director, Comprehensive Stroke Center and Stroke Unit\par [facility]\par Director, NeuroEndovascular Surgery\par [facility]

## 2021-11-02 NOTE — HISTORY OF PRESENT ILLNESS
[FreeTextEntry1] : Mr. Avila is a 66 year old man presented to [facility] on 06/12/21 with severe headache and neck pain, found to have subarachnoid hemorrhage in the prepontine cistern and left ambient and suprasellar cisterns. 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 He had 2 cerebral angiograms, both negative for any source of hemorrhage. Referred by Dr. Patrick to discuss chronic carotid occlusion.  All neuroimaging reviewed personally by me. 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 We ordered an MRA Head with NOVA, but not done as of yet and has not started the ASA as recommended at last visit. He denies any history of stroke/TIA events.

## 2021-11-02 NOTE — REVIEW OF SYSTEMS
[Fever] : no fever [Feeling Poorly] : not feeling poorly [Feeling Tired] : not feeling tired [Suicidal] : not suicidal [Anxiety] : no anxiety [Depression] : no depression [Confused or Disoriented] : no confusion [Memory Lapses or Loss] : no memory loss [Decr. Concentrating Ability] : no decrease in concentrating ability [Difficulty with Language] : no ~M difficulty with language [Changed Thought Patterns] : no change in thought patterns [Repeating Questions] : no repeated questioning about recent events [Facial Weakness] : no facial weakness [Arm Weakness] : no arm weakness [Hand Weakness] : no hand weakness [Leg Weakness] : no leg weakness [Poor Coordination] : good coordination [Difficulty Writing] : no difficulty writing [Difficulties in Speech] : no speech difficulties [Numbness] : no numbness [Tingling] : no tingling [Seizures] : no convulsions [Dizziness] : no dizziness [Fainting] : no fainting [Lightheadedness] : no lightheadedness [Vertigo] : no vertigo [Tension Headache] : no tension-type headache [Difficulty Walking] : no difficulty walking [Inability to Walk] : able to walk [Ataxia] : no ataxia [Eyesight Problems] : no eyesight problems [Loss Of Hearing] : no hearing loss [Chest Pain] : no chest pain [Wheezing] : no wheezing [Vomiting] : no vomiting [Incontinence] : no incontinence [Joint Pain] : no joint pain [Skin Wound] : no skin wound [Easy Bleeding] : no tendency for easy bleeding [Easy Bruising] : no tendency for easy bruising

## 2021-11-09 ENCOUNTER — OUTPATIENT (OUTPATIENT)
Dept: OUTPATIENT SERVICES | Facility: HOSPITAL | Age: 66
LOS: 1 days | End: 2021-11-09
Payer: COMMERCIAL

## 2021-11-09 ENCOUNTER — APPOINTMENT (OUTPATIENT)
Dept: MRI IMAGING | Facility: HOSPITAL | Age: 66
End: 2021-11-09

## 2021-11-09 DIAGNOSIS — Z00.00 ENCOUNTER FOR GENERAL ADULT MEDICAL EXAMINATION WITHOUT ABNORMAL FINDINGS: ICD-10-CM

## 2021-11-09 DIAGNOSIS — I65.29 OCCLUSION AND STENOSIS OF UNSPECIFIED CAROTID ARTERY: ICD-10-CM

## 2021-11-09 PROCEDURE — 70544 MR ANGIOGRAPHY HEAD W/O DYE: CPT | Mod: 26

## 2021-11-09 PROCEDURE — 70544 MR ANGIOGRAPHY HEAD W/O DYE: CPT

## 2021-11-30 ENCOUNTER — APPOINTMENT (OUTPATIENT)
Dept: NEUROLOGY | Facility: CLINIC | Age: 66
End: 2021-11-30
Payer: MEDICARE

## 2021-11-30 VITALS
SYSTOLIC BLOOD PRESSURE: 119 MMHG | HEIGHT: 69 IN | BODY MASS INDEX: 24.88 KG/M2 | WEIGHT: 168 LBS | DIASTOLIC BLOOD PRESSURE: 79 MMHG | HEART RATE: 92 BPM

## 2021-11-30 PROCEDURE — 99215 OFFICE O/P EST HI 40 MIN: CPT

## 2021-11-30 NOTE — DISCUSSION/SUMMARY
[FreeTextEntry1] : Mr. Avila is a 66 year old man now 6 months s/p SAH, cerebral angiogram x 2 negative for any source and found to have an incidental asymptomatic right ICA occlusion and a moderate cavernous left ICA stenosis. 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 We discussed his MRA NOVA results which shows good flow in the left ICA. Plan to repeat MRA W. NOVA in 6 months, 05/22. All of his questions and concerns were addressed.

## 2021-11-30 NOTE — REVIEW OF SYSTEMS
[Fever] : no fever [Chills] : no chills [Feeling Poorly] : not feeling poorly [Feeling Tired] : not feeling tired [Suicidal] : not suicidal [Anxiety] : no anxiety [Depression] : no depression [Confused or Disoriented] : no confusion [Memory Lapses or Loss] : no memory loss [Decr. Concentrating Ability] : no decrease in concentrating ability [Difficulty with Language] : no ~M difficulty with language [Changed Thought Patterns] : no change in thought patterns [Repeating Questions] : no repeated questioning about recent events [Facial Weakness] : no facial weakness [Arm Weakness] : no arm weakness [Hand Weakness] : no hand weakness [Leg Weakness] : no leg weakness [Poor Coordination] : good coordination [Difficulty Writing] : no difficulty writing [Difficulties in Speech] : no speech difficulties [Numbness] : no numbness [Seizures] : no convulsions [Dizziness] : no dizziness [Fainting] : no fainting [Lightheadedness] : no lightheadedness [Vertigo] : no vertigo [Tension Headache] : no tension-type headache [Difficulty Walking] : no difficulty walking [Eyesight Problems] : no eyesight problems [Loss Of Hearing] : no hearing loss [Chest Pain] : no chest pain [Shortness Of Breath] : no shortness of breath [Wheezing] : no wheezing [Vomiting] : no vomiting [Incontinence] : no incontinence [Joint Pain] : no joint pain [Skin Wound] : no skin wound [Easy Bleeding] : no tendency for easy bleeding [Easy Bruising] : no tendency for easy bruising

## 2021-11-30 NOTE — HISTORY OF PRESENT ILLNESS
[FreeTextEntry1] : Mr. Avila is a 66 year old man presented to [facility] on 06/12/21 with severe headache and neck pain, found to have subarachnoid hemorrhage in the prepontine cistern and left ambient and suprasellar cisterns. 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 He had 2 cerebral angiograms, both negative for any source of hemorrhage. All neuroimaging reviewed personally by me. 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 He is on ASA and Crestor for stroke prevention.  He denies any history of stroke/TIA events. \par

## 2021-11-30 NOTE — PHYSICAL EXAM
[General Appearance - Alert] : alert [General Appearance - Well Nourished] : well nourished [General Appearance - Well Developed] : well developed [Oriented To Time, Place, And Person] : oriented to person, place, and time [Affect] : the affect was normal [Mood] : the mood was normal [Person] : oriented to person [Place] : oriented to place [Time] : oriented to time [Concentration Intact] : normal concentrating ability [Visual Intact] : visual attention was ~T not ~L decreased [Naming Objects] : no difficulty naming common objects [Repeating Phrases] : no difficulty repeating a phrase [Writing A Sentence] : no difficulty writing a sentence [Fluency] : fluency intact [Comprehension] : comprehension intact [Reading] : reading intact [Past History] : adequate knowledge of personal past history [Cranial Nerves Optic (II)] : visual acuity intact bilaterally,  visual fields full to confrontation, pupils equal round and reactive to light [Cranial Nerves Oculomotor (III)] : extraocular motion intact [Cranial Nerves Trigeminal (V)] : facial sensation intact symmetrically [Cranial Nerves Facial (VII)] : face symmetrical [Cranial Nerves Vestibulocochlear (VIII)] : hearing was intact bilaterally [Cranial Nerves Glossopharyngeal (IX)] : tongue and palate midline [Cranial Nerves Accessory (XI - Cranial And Spinal)] : head turning and shoulder shrug symmetric [Cranial Nerves Hypoglossal (XII)] : there was no tongue deviation with protrusion [Motor Tone] : muscle tone was normal in all four extremities [Motor Strength] : muscle strength was normal in all four extremities [No Muscle Atrophy] : normal bulk in all four extremities [Sensation Tactile Decrease] : light touch was intact [Balance] : balance was intact [Full Visual Field] : full visual field [Hearing Threshold Finger Rub Not [place]] : hearing was normal [Neck Appearance] : the appearance of the neck was normal [] : no respiratory distress [Heart Rate And Rhythm] : heart rate was normal and rhythm regular [Edema] : there was no peripheral edema [Abdomen Soft] : soft [Abnormal Walk] : normal gait [Skin Color & Pigmentation] : normal skin color and pigmentation [Dysdiadochokinesia Bilaterally] : not present [Coordination - Dysmetria Impaired Finger-to-Nose Bilateral] : not present

## 2022-05-04 ENCOUNTER — APPOINTMENT (OUTPATIENT)
Dept: OPHTHALMOLOGY | Facility: CLINIC | Age: 67
End: 2022-05-04
Payer: MEDICARE

## 2022-05-04 ENCOUNTER — NON-APPOINTMENT (OUTPATIENT)
Age: 67
End: 2022-05-04

## 2022-05-04 PROCEDURE — 92014 COMPRE OPH EXAM EST PT 1/>: CPT

## 2022-05-04 PROCEDURE — 92134 CPTRZ OPH DX IMG PST SGM RTA: CPT

## 2022-08-10 ENCOUNTER — NON-APPOINTMENT (OUTPATIENT)
Age: 67
End: 2022-08-10

## 2022-08-23 ENCOUNTER — APPOINTMENT (OUTPATIENT)
Dept: MRI IMAGING | Facility: HOSPITAL | Age: 67
End: 2022-08-23

## 2022-08-23 ENCOUNTER — OUTPATIENT (OUTPATIENT)
Dept: OUTPATIENT SERVICES | Facility: HOSPITAL | Age: 67
LOS: 1 days | End: 2022-08-23
Payer: COMMERCIAL

## 2022-08-23 DIAGNOSIS — I60.9 NONTRAUMATIC SUBARACHNOID HEMORRHAGE, UNSPECIFIED: ICD-10-CM

## 2022-08-23 PROCEDURE — 70544 MR ANGIOGRAPHY HEAD W/O DYE: CPT

## 2022-08-23 PROCEDURE — 70544 MR ANGIOGRAPHY HEAD W/O DYE: CPT | Mod: 26

## 2022-08-27 ENCOUNTER — NON-APPOINTMENT (OUTPATIENT)
Age: 67
End: 2022-08-27

## 2022-08-30 ENCOUNTER — APPOINTMENT (OUTPATIENT)
Dept: NEUROLOGY | Facility: CLINIC | Age: 67
End: 2022-08-30

## 2022-08-30 VITALS — DIASTOLIC BLOOD PRESSURE: 86 MMHG | SYSTOLIC BLOOD PRESSURE: 130 MMHG | HEART RATE: 74 BPM | HEIGHT: 69 IN

## 2022-08-30 PROCEDURE — 99215 OFFICE O/P EST HI 40 MIN: CPT

## 2022-08-30 NOTE — HISTORY OF PRESENT ILLNESS
[FreeTextEntry1] : Mr. Avila is a 66 year old man presented to [facility] on 06/12/21 with severe headache and neck pain, found to have subarachnoid hemorrhage in the prepontine cistern and left ambient and suprasellar cisterns. 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 He had 2 cerebral angiograms, both negative for any source of hemorrhage.  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 Repeat MRA with NOVA performed in August 2022 was stable. All neuroimaging reviewed personally by me. He is on ASA and Crestor for stroke prevention.  He denies any history of stroke/TIA events. \par

## 2022-08-30 NOTE — DISCUSSION/SUMMARY
[FreeTextEntry1] : Mr. Avila is a 66 year old man now 6 months s/p SAH, cerebral angiogram x 2 negative for any source and found to have an incidental asymptomatic right ICA occlusion and a moderate cavernous left ICA stenosis. 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 MRA NOVA results show good flow in the left ICA and chronic occlusion of the right ICA. Plan to repeat MRA W. NOVA in one year, August 2023. Follow-up with me in one year after imaging. All of his questions and concerns were addressed.

## 2022-08-30 NOTE — PHYSICAL EXAM
[Dysdiadochokinesia Bilaterally] : not present [Coordination - Dysmetria Impaired Finger-to-Nose Bilateral] : not present

## 2022-11-09 ENCOUNTER — NON-APPOINTMENT (OUTPATIENT)
Age: 67
End: 2022-11-09

## 2022-11-09 ENCOUNTER — APPOINTMENT (OUTPATIENT)
Dept: OPHTHALMOLOGY | Facility: CLINIC | Age: 67
End: 2022-11-09

## 2022-11-09 PROCEDURE — 92014 COMPRE OPH EXAM EST PT 1/>: CPT

## 2022-11-09 PROCEDURE — 92134 CPTRZ OPH DX IMG PST SGM RTA: CPT

## 2023-05-03 ENCOUNTER — APPOINTMENT (OUTPATIENT)
Dept: OPHTHALMOLOGY | Facility: CLINIC | Age: 68
End: 2023-05-03
Payer: MEDICARE

## 2023-05-03 ENCOUNTER — NON-APPOINTMENT (OUTPATIENT)
Age: 68
End: 2023-05-03

## 2023-05-03 PROCEDURE — 92014 COMPRE OPH EXAM EST PT 1/>: CPT

## 2023-05-03 PROCEDURE — 92134 CPTRZ OPH DX IMG PST SGM RTA: CPT

## 2023-08-09 ENCOUNTER — OUTPATIENT (OUTPATIENT)
Dept: OUTPATIENT SERVICES | Facility: HOSPITAL | Age: 68
LOS: 1 days | End: 2023-08-09
Payer: COMMERCIAL

## 2023-08-09 ENCOUNTER — APPOINTMENT (OUTPATIENT)
Dept: MRI IMAGING | Facility: HOSPITAL | Age: 68
End: 2023-08-09

## 2023-08-09 DIAGNOSIS — I60.9 NONTRAUMATIC SUBARACHNOID HEMORRHAGE, UNSPECIFIED: ICD-10-CM

## 2023-08-09 DIAGNOSIS — I65.29 OCCLUSION AND STENOSIS OF UNSPECIFIED CAROTID ARTERY: ICD-10-CM

## 2023-08-09 PROCEDURE — 70544 MR ANGIOGRAPHY HEAD W/O DYE: CPT | Mod: 26

## 2023-08-09 PROCEDURE — 70544 MR ANGIOGRAPHY HEAD W/O DYE: CPT

## 2023-08-22 ENCOUNTER — APPOINTMENT (OUTPATIENT)
Dept: NEUROLOGY | Facility: CLINIC | Age: 68
End: 2023-08-22
Payer: MEDICARE

## 2023-08-22 VITALS
DIASTOLIC BLOOD PRESSURE: 89 MMHG | SYSTOLIC BLOOD PRESSURE: 143 MMHG | BODY MASS INDEX: 25.48 KG/M2 | HEIGHT: 69 IN | HEART RATE: 90 BPM | WEIGHT: 172 LBS

## 2023-08-22 DIAGNOSIS — I65.29 OCCLUSION AND STENOSIS OF UNSPECIFIED CAROTID ARTERY: ICD-10-CM

## 2023-08-22 DIAGNOSIS — I60.9 NONTRAUMATIC SUBARACHNOID HEMORRHAGE, UNSPECIFIED: ICD-10-CM

## 2023-08-22 PROCEDURE — 99215 OFFICE O/P EST HI 40 MIN: CPT

## 2023-08-22 RX ORDER — ALLOPURINOL 100 MG/1
100 TABLET ORAL
Refills: 0 | Status: ACTIVE | COMMUNITY

## 2023-08-22 NOTE — DISCUSSION/SUMMARY
[FreeTextEntry1] : Mr. Avila is a 66 year old man now 2 years s/p SAH, cerebral angiogram x 2 negative for any source and found to have an incidental asymptomatic right ICA occlusion and a moderate cavernous left ICA stenosis. 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 Repeat MRA NOVA results show good flow in the left ICA and chronic occlusion of the right ICA.  Follow-up with me in one year after imaging. No further imaging indicated unless patient develops new symptoms.  All of his questions and concerns were addressed.

## 2023-08-22 NOTE — REVIEW OF SYSTEMS
[Fever] : no fever [Chills] : no chills [Feeling Poorly] : not feeling poorly [Feeling Tired] : not feeling tired [Suicidal] : not suicidal [Anxiety] : no anxiety [Depression] : no depression [Confused or Disoriented] : no confusion [Memory Lapses or Loss] : no memory loss [Decr. Concentrating Ability] : no decrease in concentrating ability [Difficulty with Language] : no ~M difficulty with language [Changed Thought Patterns] : no change in thought patterns [Repeating Questions] : no repeated questioning about recent events [Facial Weakness] : no facial weakness [Arm Weakness] : no arm weakness [Hand Weakness] : no hand weakness [Leg Weakness] : no leg weakness [Poor Coordination] : good coordination [Difficulty Writing] : no difficulty writing [Difficulties in Speech] : no speech difficulties [Numbness] : no numbness [Tingling] : no tingling [Seizures] : no convulsions [Dizziness] : no dizziness [Fainting] : no fainting [Lightheadedness] : no lightheadedness [Vertigo] : no vertigo [Tension Headache] : no tension-type headache [Difficulty Walking] : no difficulty walking [Eyesight Problems] : no eyesight problems [Chest Pain] : no chest pain [Wheezing] : no wheezing [Vomiting] : no vomiting [Joint Pain] : no joint pain [Easy Bleeding] : no tendency for easy bleeding [Easy Bruising] : no tendency for easy bruising

## 2023-08-22 NOTE — HISTORY OF PRESENT ILLNESS
[FreeTextEntry1] : Mr. Avila is a 67 year old man presented to [facility] on 06/12/21 with severe headache and neck pain, found to have subarachnoid hemorrhage in the prepontine cistern and left ambient and suprasellar cisterns. 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 He had 2 cerebral angiograms, both negative for any source of hemorrhage. 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 Repeat MRA with NOVA performed in August 2022 was stable. MRA HEAD with NOVA 08/09/23 stable.  All neuroimaging reviewed personally by me. He is on ASA and Crestor for stroke prevention. He denies any history of stroke/TIA events.

## 2023-08-22 NOTE — PHYSICAL EXAM

## 2023-11-01 ENCOUNTER — NON-APPOINTMENT (OUTPATIENT)
Age: 68
End: 2023-11-01

## 2023-11-01 ENCOUNTER — APPOINTMENT (OUTPATIENT)
Dept: OPHTHALMOLOGY | Facility: CLINIC | Age: 68
End: 2023-11-01
Payer: MEDICARE

## 2023-11-01 PROCEDURE — 92134 CPTRZ OPH DX IMG PST SGM RTA: CPT

## 2023-11-01 PROCEDURE — 92012 INTRM OPH EXAM EST PATIENT: CPT | Mod: 25

## 2024-04-26 ENCOUNTER — NON-APPOINTMENT (OUTPATIENT)
Age: 69
End: 2024-04-26

## 2024-05-01 ENCOUNTER — NON-APPOINTMENT (OUTPATIENT)
Age: 69
End: 2024-05-01

## 2024-05-01 ENCOUNTER — APPOINTMENT (OUTPATIENT)
Dept: OPHTHALMOLOGY | Facility: CLINIC | Age: 69
End: 2024-05-01
Payer: MEDICARE

## 2024-05-01 PROCEDURE — 92134 CPTRZ OPH DX IMG PST SGM RTA: CPT

## 2024-05-01 PROCEDURE — 92014 COMPRE OPH EXAM EST PT 1/>: CPT | Mod: 25

## 2024-08-08 ENCOUNTER — APPOINTMENT (OUTPATIENT)
Dept: CARDIOLOGY | Facility: CLINIC | Age: 69
End: 2024-08-08

## 2024-08-08 ENCOUNTER — NON-APPOINTMENT (OUTPATIENT)
Age: 69
End: 2024-08-08

## 2024-08-08 PROCEDURE — 99215 OFFICE O/P EST HI 40 MIN: CPT | Mod: 25

## 2024-08-08 PROCEDURE — 93000 ELECTROCARDIOGRAM COMPLETE: CPT

## 2024-08-08 NOTE — CARDIOLOGY SUMMARY
[de-identified] : Sinus rhythm @ 89/min. Possible left atrial abnormality. Prominent R in V1 (nonspecific).

## 2024-08-08 NOTE — PHYSICAL EXAM
[Well Developed] : well developed [Normal Conjunctiva] : normal conjunctiva [Normal S1, S2] : normal S1, S2 [Murmur] : murmur [Clear Lung Fields] : clear lung fields [Soft] : abdomen soft [Normal Gait] : normal gait [No Edema] : no edema [Moves all extremities] : moves all extremities [Alert and Oriented] : alert and oriented [de-identified] : 1-2/6 systolic murmur at the apex

## 2024-08-08 NOTE — ASSESSMENT
[FreeTextEntry1] : Impression: Hypertension which is probably controlled (diastolic was a little high today).  Peripheral vascular disease (carotid, aortic also seen on imaging).  I suspect the soft murmur on exam is more likely to be from a hyperdynamic heart than from any valvular abnormality.   Plan: Check Lp(a) Echocardiogram  Further recommendations (if any) to-follow.   Total time 50 minutes.

## 2024-08-08 NOTE — CARDIOLOGY SUMMARY
[de-identified] : Sinus rhythm @ 89/min. Possible left atrial abnormality. Prominent R in V1 (nonspecific).

## 2024-08-08 NOTE — REVIEW OF SYSTEMS
[Dyspnea on exertion] : not dyspnea during exertion [Chest Discomfort] : no chest discomfort [Lower Ext Edema] : no extremity edema [Palpitations] : no palpitations [Orthopnea] : no orthopnea [PND] : no PND [Syncope] : no syncope [Negative] : Psychiatric

## 2024-08-08 NOTE — HISTORY OF PRESENT ILLNESS
[FreeTextEntry1] : 68-year-old man with a history of hypertension, hypercholesterolemia, and an incidental internal carotid occlusion and idiopathic SAH.   Mr. Avila is currently without any cardiovascular symptoms. He can ascend inclines without chest pain or dyspnea. He is currently managed with an angiotensin receptor blocker for hypertension and rosuvastatin 5 mg/d. He reports having a comprehensive set of labs having been drawn last week by primary care, all of which he was told were "normal".

## 2024-08-08 NOTE — PHYSICAL EXAM
[Well Developed] : well developed [Normal Conjunctiva] : normal conjunctiva [Normal S1, S2] : normal S1, S2 [Murmur] : murmur [Clear Lung Fields] : clear lung fields [Soft] : abdomen soft [Normal Gait] : normal gait [No Edema] : no edema [Moves all extremities] : moves all extremities [Alert and Oriented] : alert and oriented [de-identified] : 1-2/6 systolic murmur at the apex

## 2024-08-13 ENCOUNTER — APPOINTMENT (OUTPATIENT)
Dept: NEUROLOGY | Facility: CLINIC | Age: 69
End: 2024-08-13
Payer: MEDICARE

## 2024-08-13 VITALS
DIASTOLIC BLOOD PRESSURE: 97 MMHG | HEIGHT: 69 IN | WEIGHT: 170 LBS | SYSTOLIC BLOOD PRESSURE: 148 MMHG | BODY MASS INDEX: 25.18 KG/M2 | HEART RATE: 92 BPM

## 2024-08-13 DIAGNOSIS — I60.9 NONTRAUMATIC SUBARACHNOID HEMORRHAGE, UNSPECIFIED: ICD-10-CM

## 2024-08-13 DIAGNOSIS — I65.29 OCCLUSION AND STENOSIS OF UNSPECIFIED CAROTID ARTERY: ICD-10-CM

## 2024-08-13 PROCEDURE — G2211 COMPLEX E/M VISIT ADD ON: CPT

## 2024-08-13 PROCEDURE — 99215 OFFICE O/P EST HI 40 MIN: CPT

## 2024-08-13 NOTE — PHYSICAL EXAM
[General Appearance - Alert] : alert [General Appearance - Well Nourished] : well nourished [General Appearance - Well Developed] : well developed [Oriented To Time, Place, And Person] : oriented to person, place, and time [Affect] : the affect was normal [Mood] : the mood was normal [Person] : oriented to person [Place] : oriented to place [Time] : oriented to time [Concentration Intact] : normal concentrating ability [Visual Intact] : visual attention was ~T not ~L decreased [Naming Objects] : no difficulty naming common objects [Repeating Phrases] : no difficulty repeating a phrase [Writing A Sentence] : no difficulty writing a sentence [Fluency] : fluency intact [Comprehension] : comprehension intact [Reading] : reading intact [Past History] : adequate knowledge of personal past history [Cranial Nerves Optic (II)] : visual acuity intact bilaterally,  visual fields full to confrontation, pupils equal round and reactive to light [Cranial Nerves Oculomotor (III)] : extraocular motion intact [Cranial Nerves Trigeminal (V)] : facial sensation intact symmetrically [Cranial Nerves Facial (VII)] : face symmetrical [Cranial Nerves Vestibulocochlear (VIII)] : hearing was intact bilaterally [Cranial Nerves Glossopharyngeal (IX)] : tongue and palate midline [Cranial Nerves Accessory (XI - Cranial And Spinal)] : head turning and shoulder shrug symmetric [Cranial Nerves Hypoglossal (XII)] : there was no tongue deviation with protrusion [Motor Tone] : muscle tone was normal in all four extremities [Motor Strength] : muscle strength was normal in all four extremities [No Muscle Atrophy] : normal bulk in all four extremities [Sensation Tactile Decrease] : light touch was intact [Abnormal Walk] : normal gait [Balance] : balance was intact [Neck Appearance] : the appearance of the neck was normal [] : no respiratory distress [Heart Rate And Rhythm] : heart rate was normal and rhythm regular

## 2024-08-13 NOTE — DISCUSSION/SUMMARY
[FreeTextEntry1] : Mr. Avila is a 68 year old man now 3 years s/p SAH, cerebral angiogram x 2 negative for any source and found to have an incidental asymptomatic right ICA occlusion and a moderate cavernous left ICA stenosis. 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 Repeat MRA NOVA results show good flow in the left ICA and chronic occlusion of the right ICA. He comes in today for a follow up visit. He is doing well with no complaints.  No further imaging indicated unless patient develops new symptoms. I will see her again in 1 year. All of his questions and concerns were addressed.

## 2024-08-13 NOTE — HISTORY OF PRESENT ILLNESS
[FreeTextEntry1] : Mr. Avila is a 68 year old man presented to [facility] on 06/12/21 with severe headache and neck pain, found to have subarachnoid hemorrhage in the prepontine cistern and left ambient and suprasellar cisterns. 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 He had 2 cerebral angiograms, both negative for any source of hemorrhage. 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 Repeat MRA with NOVA performed in August 2022 was stable. MRA HEAD with NOVA 08/09/23 stable. All neuroimaging reviewed personally by me. He is on ASA and Crestor for stroke prevention. He was found to have a thyroid nodule going for a biopsy this month.  He denies any history of stroke/TIA events.

## 2024-09-20 NOTE — DIETITIAN INITIAL EVALUATION ADULT. - NUTRITION DIAGNOSITC TERMINOLOGY #1
Detail Level: Detailed Quality 226: Preventive Care And Screening: Tobacco Use: Screening And Cessation Intervention: Patient screened for tobacco use and is an ex/non-smoker Increased Nutrient Needs...

## 2024-10-03 ENCOUNTER — APPOINTMENT (OUTPATIENT)
Dept: CV DIAGNOSITCS | Facility: HOSPITAL | Age: 69
End: 2024-10-03

## 2024-10-04 ENCOUNTER — OUTPATIENT (OUTPATIENT)
Dept: OUTPATIENT SERVICES | Facility: HOSPITAL | Age: 69
LOS: 1 days | End: 2024-10-04
Payer: MEDICARE

## 2024-10-04 ENCOUNTER — APPOINTMENT (OUTPATIENT)
Dept: CV DIAGNOSITCS | Facility: HOSPITAL | Age: 69
End: 2024-10-04

## 2024-10-04 ENCOUNTER — RESULT REVIEW (OUTPATIENT)
Age: 69
End: 2024-10-04

## 2024-10-04 DIAGNOSIS — I42.2 OTHER HYPERTROPHIC CARDIOMYOPATHY: ICD-10-CM

## 2024-10-04 PROCEDURE — 93306 TTE W/DOPPLER COMPLETE: CPT | Mod: 26

## 2024-10-04 PROCEDURE — 93306 TTE W/DOPPLER COMPLETE: CPT

## 2024-11-08 ENCOUNTER — NON-APPOINTMENT (OUTPATIENT)
Age: 69
End: 2024-11-08

## 2024-11-08 ENCOUNTER — APPOINTMENT (OUTPATIENT)
Dept: OPHTHALMOLOGY | Facility: CLINIC | Age: 69
End: 2024-11-08
Payer: MEDICARE

## 2024-11-08 PROCEDURE — 92014 COMPRE OPH EXAM EST PT 1/>: CPT | Mod: 25

## 2024-11-08 PROCEDURE — 92134 CPTRZ OPH DX IMG PST SGM RTA: CPT

## 2025-05-14 ENCOUNTER — NON-APPOINTMENT (OUTPATIENT)
Age: 70
End: 2025-05-14

## 2025-05-14 ENCOUNTER — APPOINTMENT (OUTPATIENT)
Dept: OPHTHALMOLOGY | Facility: CLINIC | Age: 70
End: 2025-05-14
Payer: MEDICARE

## 2025-05-14 PROCEDURE — 92014 COMPRE OPH EXAM EST PT 1/>: CPT | Mod: 25

## 2025-05-14 PROCEDURE — 92134 CPTRZ OPH DX IMG PST SGM RTA: CPT

## 2025-07-03 ENCOUNTER — APPOINTMENT (OUTPATIENT)
Dept: CARDIOLOGY | Facility: CLINIC | Age: 70
End: 2025-07-03

## 2025-07-15 ENCOUNTER — NON-APPOINTMENT (OUTPATIENT)
Age: 70
End: 2025-07-15

## 2025-07-17 ENCOUNTER — APPOINTMENT (OUTPATIENT)
Dept: CARDIOLOGY | Facility: CLINIC | Age: 70
End: 2025-07-17

## 2025-07-17 VITALS
OXYGEN SATURATION: 95 % | HEIGHT: 69 IN | HEART RATE: 92 BPM | WEIGHT: 171 LBS | DIASTOLIC BLOOD PRESSURE: 77 MMHG | BODY MASS INDEX: 25.33 KG/M2 | SYSTOLIC BLOOD PRESSURE: 116 MMHG

## 2025-07-17 PROCEDURE — 99214 OFFICE O/P EST MOD 30 MIN: CPT | Mod: 25

## 2025-07-17 PROCEDURE — 93000 ELECTROCARDIOGRAM COMPLETE: CPT

## 2025-08-05 ENCOUNTER — APPOINTMENT (OUTPATIENT)
Dept: NEUROLOGY | Facility: CLINIC | Age: 70
End: 2025-08-05
Payer: MEDICARE

## 2025-08-05 VITALS
SYSTOLIC BLOOD PRESSURE: 128 MMHG | HEIGHT: 69 IN | BODY MASS INDEX: 25.33 KG/M2 | HEART RATE: 88 BPM | DIASTOLIC BLOOD PRESSURE: 88 MMHG | WEIGHT: 171 LBS

## 2025-08-05 DIAGNOSIS — I60.9 NONTRAUMATIC SUBARACHNOID HEMORRHAGE, UNSPECIFIED: ICD-10-CM

## 2025-08-05 DIAGNOSIS — I65.29 OCCLUSION AND STENOSIS OF UNSPECIFIED CAROTID ARTERY: ICD-10-CM

## 2025-08-05 PROCEDURE — G2211 COMPLEX E/M VISIT ADD ON: CPT

## 2025-08-05 PROCEDURE — 99215 OFFICE O/P EST HI 40 MIN: CPT
